# Patient Record
Sex: MALE | Race: WHITE | NOT HISPANIC OR LATINO | Employment: OTHER | URBAN - METROPOLITAN AREA
[De-identification: names, ages, dates, MRNs, and addresses within clinical notes are randomized per-mention and may not be internally consistent; named-entity substitution may affect disease eponyms.]

---

## 2017-02-20 ENCOUNTER — OFFICE VISIT (OUTPATIENT)
Dept: INTERNAL MEDICINE | Facility: CLINIC | Age: 57
End: 2017-02-20
Payer: COMMERCIAL

## 2017-02-20 VITALS
BODY MASS INDEX: 32.34 KG/M2 | TEMPERATURE: 98.5 F | DIASTOLIC BLOOD PRESSURE: 72 MMHG | SYSTOLIC BLOOD PRESSURE: 122 MMHG | HEART RATE: 56 BPM | OXYGEN SATURATION: 94 % | HEIGHT: 73 IN | WEIGHT: 244 LBS

## 2017-02-20 DIAGNOSIS — F12.10 TETRAHYDROCANNABINOL (THC) USE DISORDER, MILD, ABUSE: ICD-10-CM

## 2017-02-20 DIAGNOSIS — Z12.5 SCREENING FOR PROSTATE CANCER: ICD-10-CM

## 2017-02-20 DIAGNOSIS — E78.5 HYPERLIPIDEMIA WITH TARGET LDL LESS THAN 130: ICD-10-CM

## 2017-02-20 DIAGNOSIS — Z11.59 NEED FOR HEPATITIS C SCREENING TEST: ICD-10-CM

## 2017-02-20 DIAGNOSIS — Z12.11 SPECIAL SCREENING FOR MALIGNANT NEOPLASMS, COLON: ICD-10-CM

## 2017-02-20 DIAGNOSIS — R73.9 BLOOD GLUCOSE ELEVATED: ICD-10-CM

## 2017-02-20 DIAGNOSIS — Z91.030 BEE STING ALLERGY: ICD-10-CM

## 2017-02-20 LAB
ALBUMIN SERPL-MCNC: 4.3 G/DL (ref 3.4–5)
ALP SERPL-CCNC: 57 U/L (ref 40–150)
ALT SERPL W P-5'-P-CCNC: 38 U/L (ref 0–70)
ANION GAP SERPL CALCULATED.3IONS-SCNC: 8 MMOL/L (ref 3–14)
AST SERPL W P-5'-P-CCNC: 18 U/L (ref 0–45)
BILIRUB SERPL-MCNC: 0.6 MG/DL (ref 0.2–1.3)
BUN SERPL-MCNC: 15 MG/DL (ref 7–30)
CALCIUM SERPL-MCNC: 9.1 MG/DL (ref 8.5–10.1)
CHLORIDE SERPL-SCNC: 107 MMOL/L (ref 94–109)
CHOLEST SERPL-MCNC: 218 MG/DL
CO2 SERPL-SCNC: 25 MMOL/L (ref 20–32)
CREAT SERPL-MCNC: 1 MG/DL (ref 0.66–1.25)
GFR SERPL CREATININE-BSD FRML MDRD: 77 ML/MIN/1.7M2
GLUCOSE SERPL-MCNC: 128 MG/DL (ref 70–99)
HBA1C MFR BLD: 5.8 % (ref 4.3–6)
HCV AB SERPL QL IA: NORMAL
HDLC SERPL-MCNC: 37 MG/DL
LDLC SERPL CALC-MCNC: 143 MG/DL
NONHDLC SERPL-MCNC: 181 MG/DL
POTASSIUM SERPL-SCNC: 4.2 MMOL/L (ref 3.4–5.3)
PROT SERPL-MCNC: 8 G/DL (ref 6.8–8.8)
PSA SERPL-ACNC: 1.42 UG/L (ref 0–4)
SODIUM SERPL-SCNC: 140 MMOL/L (ref 133–144)
TRIGL SERPL-MCNC: 191 MG/DL

## 2017-02-20 PROCEDURE — G0103 PSA SCREENING: HCPCS | Performed by: INTERNAL MEDICINE

## 2017-02-20 PROCEDURE — 80053 COMPREHEN METABOLIC PANEL: CPT | Performed by: INTERNAL MEDICINE

## 2017-02-20 PROCEDURE — 83036 HEMOGLOBIN GLYCOSYLATED A1C: CPT | Performed by: INTERNAL MEDICINE

## 2017-02-20 PROCEDURE — 99214 OFFICE O/P EST MOD 30 MIN: CPT | Performed by: INTERNAL MEDICINE

## 2017-02-20 PROCEDURE — 36415 COLL VENOUS BLD VENIPUNCTURE: CPT | Performed by: INTERNAL MEDICINE

## 2017-02-20 PROCEDURE — 80061 LIPID PANEL: CPT | Performed by: INTERNAL MEDICINE

## 2017-02-20 PROCEDURE — 86803 HEPATITIS C AB TEST: CPT | Performed by: INTERNAL MEDICINE

## 2017-02-20 RX ORDER — EPINEPHRINE 0.3 MG/.3ML
0.3 INJECTION SUBCUTANEOUS
Qty: 0.6 ML | Refills: 3 | Status: SHIPPED | OUTPATIENT
Start: 2017-02-20 | End: 2018-05-16

## 2017-02-20 RX ORDER — EPINEPHRINE 0.3 MG/.3ML
0.3 INJECTION SUBCUTANEOUS
Status: CANCELLED | OUTPATIENT
Start: 2017-02-20

## 2017-02-20 NOTE — NURSING NOTE
"Chief Complaint   Patient presents with     Recheck Medication       Initial /72  Pulse 56  Temp 98.5  F (36.9  C) (Oral)  Ht 6' 1\" (1.854 m)  Wt 244 lb (110.7 kg)  SpO2 94%  BMI 32.19 kg/m2 Estimated body mass index is 32.19 kg/(m^2) as calculated from the following:    Height as of this encounter: 6' 1\" (1.854 m).    Weight as of this encounter: 244 lb (110.7 kg).  Medication Reconciliation: complete    "

## 2017-02-20 NOTE — PROGRESS NOTES
"  SUBJECTIVE:                                                    Chalo Huynh is a 56 year old male who presents to clinic today for the following health issues:      Medication Followup    Taking Medication as prescribed: NO    Side Effects:  None    Medication Helping Symptoms:  not applicable     Pt's past medical history, family history, habits, medications and allergies were reviewed with the patient today.  See snap shot for  HCM status. Most recent lab results reviewed with pt. Problem list and histories reviewed & adjusted, as indicated.  Additional history as below:     Pt declines flu shot and colonoscopy (doesn't have a  available).  Smoking THC on WE's and wants to quit. Asking about meds to try. Denies CP, SOB, abdominal pain, polyuria, polydipsia, vision changes, extremity numbness/parasthesias or skin problems.  Hx elevated lipids and glucose. Weight up 5 pounds. Rare exercise     Lab Results   Component Value Date     03/10/2015     Lab Results   Component Value Date    A1C 5.8 03/10/2015     Lab Results   Component Value Date    CHOL 176 03/10/2015     Lab Results   Component Value Date     03/10/2015     Lab Results   Component Value Date    HDL 36 03/10/2015     Lab Results   Component Value Date    TRIG 167 03/10/2015     Lab Results   Component Value Date    CR 0.93 03/10/2015     Lab Results   Component Value Date    ALT 33 03/10/2015     Lab Results   Component Value Date    AST 14 03/10/2015     Lab Results   Component Value Date    MICROL <5 03/10/2015     Lab Results   Component Value Date    TSH 3.21 03/10/2015       Additional ROS:   Constitutional, HEENT, Cardiovascular, Pulmonary, GI and , Neuro, MSK and Psych review of systems/symptoms are otherwise negative or unchanged from previous, except as noted above.      OBJECTIVE:  /72  Pulse 56  Temp 98.5  F (36.9  C) (Oral)  Ht 6' 1\" (1.854 m)  Wt 244 lb (110.7 kg)  SpO2 94%  BMI 32.19 kg/m2   Estimated body " "mass index is 32.19 kg/(m^2) as calculated from the following:    Height as of this encounter: 6' 1\" (1.854 m).    Weight as of this encounter: 244 lb (110.7 kg).  Eye: PERRL, EOMI  HENT: ear canals and TM's normal and nose and mouth without ulcers or lesions   Neck: no adenopathy. Thyroid normal to palpation. No bruits  Pulm: Lungs clear to auscultation   CV: Regular rates and rhythm  GI: Soft, mildly obese, nontender, Normal active bowel sounds, No hepatosplenomegaly or masses palpable  Ext: Peripheral pulses intact. No edema.  Neuro: Normal strength and tone, sensory exam grossly normal  Rectal: prostate symmetric w/o nodularity, no masses palpated, stool guaiac negative and prostate 1+  : testicles normal without atrophy or masses, no hernias and penis normal without urethral discharge     Assessment/Plan: (See plan discussion below for further details)  1. Bee sting allergy   Epi pen as needed  - EPINEPHrine 0.3 MG/0.3ML injection; Inject 0.3 mLs (0.3 mg) into the muscle once as needed for anaphylaxis or other  Dispense: 0.6 mL; Refill: 3    2. Tetrahydrocannabinol (THC) use disorder, mild, abuse  Literature search completed. Only partially effective medication found is acetylcysteine 1200 mg twice a day that was used in an 8 week trial.    Not available in prescription but available OTC in supplement ([employer]). Reviewed  internet and risk for possible N/V and diarrhea with supplement. Pt counselled therefore to try and stop use THC without additional medication treatment  3. Hyperlipidemia with target LDL less than 130  Labs as ordered  - Lipid panel reflex to direct LDL  - Comprehensive metabolic panel    4. Blood glucose elevated  Labs as ordered. Diet counselling as below  - Lipid panel reflex to direct LDL  - Comprehensive metabolic panel  - Hemoglobin A1c    5. Special screening for malignant neoplasms, colon  - Fecal colorectal cancer screen (FIT); Future    6. Screening for prostate cancer   " PSA ordered for intermittent monitoring after discussion with pt and pt preference. Not doing annual PSA per USPTF recs  - Prostate spec antigen screen    7. Need for hepatitis C screening test  Pt meets criteria for hepatitis C screening based on birth year 1946-65. Discussed pro/cons of Hep C screening/treatment and recs of USPTF. Pt agreeable to screening  - Hepatitis C Screen Reflex to HCV RNA Quant and Genotype    Plan discussion:  Labs today as ordered  Calorie/carbohydrate (sugar, bread, potato, pasta, etc) reduction in diet for weight loss.  Increase color on your plate with fruits and vegetables. Increase  frequency of walking or other aerobic exercise as able (goal is daily)  Pt declines colonoscopy. Will do FIT  Pt declines flu shot          Hamilton Burnett MD  Internal Medicine Department  [facility]

## 2017-02-20 NOTE — MR AVS SNAPSHOT
"              After Visit Summary   2/20/2017    Chalo Huynh    MRN: [id number]           Patient Information     Date Of Birth          1960        Visit Information        Provider Department      2/20/2017 7:00 AM Hamilton Burnett MD [facility]        Today's Diagnoses     Bee sting allergy        Tetrahydrocannabinol (THC) use disorder, mild, abuse        Hyperlipidemia with target LDL less than 130        Blood glucose elevated        Special screening for malignant neoplasms, colon        Screening for prostate cancer        Need for hepatitis C screening test           Follow-ups after your visit        Who to contact     If you have questions or need follow up information about today's clinic visit or your schedule please contact [facility] directly at [phone].  Normal or non-critical lab and imaging results will be communicated to you by [employer], letter or phone within 4 business days after the clinic has received the results. If you do not hear from us within 7 days, please contact the clinic through [employer] or phone. If you have a critical or abnormal lab result, we will notify you by phone as soon as possible.  Submit refill requests through [employer] or call your pharmacy and they will forward the refill request to us. Please allow 3 business days for your refill to be completed.          Additional Information About Your Visit        [employer] Information     [employer] lets you send messages to your doctor, view your test results, renew your prescriptions, schedule appointments and more. To sign up, go to [email or web address] . Click on \"Log in\" on the left side of the screen, which will take you to the Welcome page. Then click on \"Sign up Now\" on the right side of the page.     You will be asked to enter the access code listed below, as well as some personal information. Please follow the directions to create your username and password.   " "  Your access code is: [email or web address]  Expires: 2017 10:34 AM     Your access code will  in 90 days. If you need help or a new code, please call your [facility] or [phone].        Care EveryWhere ID     This is your Care EveryWhere ID. This could be used by other organizations to access your [place] medical records  [id number]        Your Vitals Were     Pulse Temperature Height Pulse Oximetry BMI (Body Mass Index)       56 98.5  F (36.9  C) (Oral) 6' 1\" (1.854 m) 94% 32.19 kg/m2        Blood Pressure from Last 3 Encounters:   17 122/72   09/22/15 140/90   03/05/15 136/72    Weight from Last 3 Encounters:   17 244 lb (110.7 kg)   09/22/15 239 lb (108.4 kg)   03/05/15 253 lb (114.8 kg)              We Performed the Following     Comprehensive metabolic panel     Hemoglobin A1c     Hepatitis C Screen Reflex to HCV RNA Quant and Genotype     Lipid panel reflex to direct LDL     Prostate spec antigen screen          Today's Medication Changes          These changes are accurate as of: 17 11:59 PM.  If you have any questions, ask your nurse or doctor.               These medicines have changed or have updated prescriptions.        Dose/Directions    EPINEPHrine 0.3 MG/0.3ML injection   This may have changed:    - reasons to take this  - Another medication with the same name was removed. Continue taking this medication, and follow the directions you see here.   Used for:  Bee sting allergy   Changed by:  Hamilton Burnett MD        Dose:  0.3 mg   Inject 0.3 mLs (0.3 mg) into the muscle once as needed for anaphylaxis or other   Quantity:  0.6 mL   Refills:  3         Stop taking these medicines if you haven't already. Please contact your care team if you have questions.     acetaminophen-codeine 300-30 MG per tablet   Commonly known as:  TYLENOL w/CODEINE No. 3   Stopped by:  Hamilton Burnett MD           cetirizine 10 MG tablet   Commonly known as:  zyrTEC   Stopped by:  Hamilton Burnett MD    "        clobetasol 0.05 % ointment   Commonly known as:  TEMOVATE   Stopped by:  Hamilton Burnett MD           fluocinolone 0.01 % cream   Commonly known as:  SYNALAR   Stopped by:  Hamilton Burnett MD           ibuprofen 600 MG tablet   Commonly known as:  ADVIL/MOTRIN   Stopped by:  Hamilton Burnett MD           omeprazole 20 MG tablet   Stopped by:  Hamilton Burnett MD           order for DME   Stopped by:  Hamilton Burnett MD                Where to get your medicines      These medications were sent to [facility] [id number] [place], [place] [address] AT [facility] [address], [facility] [address]    Hours:  24-hours Phone:  [phone]     EPINEPHrine 0.3 MG/0.3ML injection                Primary Care Provider Office Phone # Fax #    Hamilton Burnett [phone]       [facility] [address] [facility] [address]        Thank you!     Thank you for choosing [facility]  for your care. Our goal is always to provide you with excellent care. Hearing back from our patients is one way we can continue to improve our services. Please take a few minutes to complete the written survey that you may receive in the mail after your visit with us. Thank you!             Your Updated Medication List - Protect others around you: Learn how to safely use, store and throw away your medicines at www.disposemymeds.org.          This list is accurate as of: 2/20/17 11:59 PM.  Always use your most recent med list.                   Brand Name Dispense Instructions for use    aspirin 81 MG tablet          1 tab po QD (Once per day)       EPINEPHrine 0.3 MG/0.3ML injection     0.6 mL    Inject 0.3 mLs (0.3 mg) into the muscle once as needed for anaphylaxis or other

## 2017-02-25 DIAGNOSIS — E78.5 HYPERLIPIDEMIA LDL GOAL <100: ICD-10-CM

## 2017-02-25 DIAGNOSIS — R73.9 BLOOD GLUCOSE ELEVATED: Primary | ICD-10-CM

## 2017-03-01 ENCOUNTER — TELEPHONE (OUTPATIENT)
Dept: INTERNAL MEDICINE | Facility: CLINIC | Age: 57
End: 2017-03-01

## 2017-03-01 NOTE — TELEPHONE ENCOUNTER
Pt should not require pre-dental abx now for hx of  PDA repair done as a child per most recent AHA guidelines

## 2017-03-01 NOTE — TELEPHONE ENCOUNTER
Dental office called and had a question on rather or not patient would need antibiotics before any dental work due to  previous open heart surgery?    They can be reached back at [phone] and a voicemail can be left as well.

## 2017-03-07 NOTE — TELEPHONE ENCOUNTER
Pt is not on immunosupressants. My original advice is the same. No abx. If pt wants to talk about this more, then he may make a f/u appt  With me to discuss

## 2017-03-07 NOTE — TELEPHONE ENCOUNTER
Dental Office called again and pt states that he has no immune system and wants to know if the pt needs meds in this case? pls advise and Call dental off tele # [phone]

## 2017-04-17 ENCOUNTER — OFFICE VISIT (OUTPATIENT)
Dept: INTERNAL MEDICINE | Facility: CLINIC | Age: 57
End: 2017-04-17
Payer: COMMERCIAL

## 2017-04-17 VITALS
OXYGEN SATURATION: 94 % | DIASTOLIC BLOOD PRESSURE: 72 MMHG | BODY MASS INDEX: 30.34 KG/M2 | SYSTOLIC BLOOD PRESSURE: 130 MMHG | HEART RATE: 59 BPM | TEMPERATURE: 97.7 F | WEIGHT: 230 LBS

## 2017-04-17 DIAGNOSIS — R73.9 BLOOD GLUCOSE ELEVATED: ICD-10-CM

## 2017-04-17 DIAGNOSIS — E78.5 HYPERLIPIDEMIA LDL GOAL <100: Primary | ICD-10-CM

## 2017-04-17 DIAGNOSIS — E66.9 NON MORBID OBESITY, UNSPECIFIED OBESITY TYPE: ICD-10-CM

## 2017-04-17 PROCEDURE — 99213 OFFICE O/P EST LOW 20 MIN: CPT | Performed by: INTERNAL MEDICINE

## 2017-04-17 NOTE — MR AVS SNAPSHOT
After Visit Summary   4/17/2017    Chalo Huynh    MRN: [id number]           Patient Information     Date Of Birth          1960        Visit Information        Provider Department      4/17/2017 3:00 PM Hamilton Burnett MD [facility]        Today's Diagnoses     Hyperlipidemia LDL goal <100    -  1    Blood glucose elevated        Non morbid obesity, unspecified obesity type           Follow-ups after your visit        Your next 10 appointments already scheduled     May 25, 2017  9:00 AM CDT   LAB with [facility]   [facility]    [address] [id number] [phone]           Patient must bring picture ID.  Patient should be prepared to give a urine specimen  Please do not eat 10-12 hours before your appointment if you are coming in fasting for labs on lipids, cholesterol, or glucose (sugar).  Pregnant women should follow their Care Team instructions. Water with medications is okay. Do not drink coffee or other fluids.   If you have concerns about taking  your medications, please ask at office or if scheduling via [employer], send a message by clicking on Secure Messaging, Message Your Care Team.              Who to contact     If you have questions or need follow up information about today's clinic visit or your schedule please contact [facility] directly at [phone].  Normal or non-critical lab and imaging results will be communicated to you by [employer], letter or phone within 4 business days after the clinic has received the results. If you do not hear from us within 7 days, please contact the clinic through [employer] or phone. If you have a critical or abnormal lab result, we will notify you by phone as soon as possible.  Submit refill requests through [employer] or call your pharmacy and they will forward the refill request to us. Please allow 3 business days  "for your refill to be completed.          Additional Information About Your Visit        [employer] Information     [employer] lets you send messages to your doctor, view your test results, renew your prescriptions, schedule appointments and more. To sign up, go to [email or web address] . Click on \"Log in\" on the left side of the screen, which will take you to the Welcome page. Then click on \"Sign up Now\" on the right side of the page.     You will be asked to enter the access code listed below, as well as some personal information. Please follow the directions to create your username and password.     Your access code is: [email or web address]  Expires: 2017 11:34 AM     Your access code will  in 90 days. If you need help or a new code, please call your [place] clinic or [phone].        Care EveryWhere ID     This is your Care EveryWhere ID. This could be used by other organizations to access your [place] medical records  [id number]        Your Vitals Were     Pulse Temperature Pulse Oximetry BMI (Body Mass Index)          59 97.7  F (36.5  C) (Oral) 94% 30.34 kg/m2         Blood Pressure from Last 3 Encounters:   17 130/72   17 122/72   09/22/15 140/90    Weight from Last 3 Encounters:   17 230 lb (104.3 kg)   17 244 lb (110.7 kg)   09/22/15 239 lb (108.4 kg)              Today, you had the following     No orders found for display       Primary Care Provider Office Phone # Fax #    Hamilton Burnett [phone]       [facility] [address] [facility] [address]        Thank you!     Thank you for choosing [facility]  for your care. Our goal is always to provide you with excellent care. Hearing back from our patients is one way we can continue to improve our services. Please take a few minutes to complete the written survey that you may receive in the mail after your visit with us. Thank you!             Your Updated Medication List " - Protect others around you: Learn how to safely use, store and throw away your medicines at www.disposemymeds.org.          This list is accurate as of: 4/17/17 11:59 PM.  Always use your most recent med list.                   Brand Name Dispense Instructions for use    aspirin 81 MG tablet          1 tab po QD (Once per day)       EPINEPHrine 0.3 MG/0.3ML injection     0.6 mL    Inject 0.3 mLs (0.3 mg) into the muscle once as needed for anaphylaxis or other

## 2017-04-17 NOTE — PROGRESS NOTES
SUBJECTIVE:                                                    Chalo Huynh is a 57 year old male who presents to clinic today for the following health issues:    Chief Complaint   Patient presents with     Patient/info Update     needs to update chart in order to get life insurance     Pt's past medical history, family history, habits, medications and allergies were reviewed with the patient today.  See snap shot for  HCM status. Most recent lab results reviewed with pt. Problem list and histories reviewed & adjusted, as indicated.  Additional history as below:    Pt last seen 2 mos ago. After that appt, pt stopped all smoking of marijuana on his own without other intervention by MD. Pt denies other tobacco use now.Denies CP, SOB, abdominal pain, polyuria, polydipsia, vision changes, extremity numbness/parasthesias or skin problems.  Mood good. Elevated glucose and lipids as below. Working on dietary treatment. Since that time, pt exercising more and improved diet and has lost 14 pounds. Will have repeat labs in late May for 3 mos recheck    Lab Results   Component Value Date     02/20/2017     Lab Results   Component Value Date    A1C 5.8 02/20/2017     Lab Results   Component Value Date    CHOL 218 02/20/2017     Lab Results   Component Value Date     02/20/2017     Lab Results   Component Value Date    HDL 37 02/20/2017     Lab Results   Component Value Date    TRIG 191 02/20/2017     Lab Results   Component Value Date    CR 1.00 02/20/2017     Lab Results   Component Value Date    ALT 38 02/20/2017     Lab Results   Component Value Date    AST 18 02/20/2017     Lab Results   Component Value Date    MICROL <5 03/10/2015     Lab Results   Component Value Date    TSH 3.21 03/10/2015     No identified additional risks  The 10-year ASCVD risk score (Deysi VANE Jr, et al., 2013) is: 10.3%    Values used to calculate the score:      Age: 57 years      Sex: Male      Is Non- :  "No      Diabetic: No      Tobacco smoker: No      Systolic Blood Pressure: 130 mmHg      Is BP treated: No      HDL Cholesterol: 37 mg/dL      Total Cholesterol: 218 mg/dL    Above based on old labs and will be reassessed with repeat lipids labs in late May before deciding on whether to start statin therapy     Additional ROS:   Constitutional, HEENT, Cardiovascular, Pulmonary, GI and , Neuro, MSK and Psych review of systems/symptoms are otherwise negative or unchanged from previous, except as noted above.      OBJECTIVE:  /72  Pulse 59  Temp 97.7  F (36.5  C) (Oral)  Wt 230 lb (104.3 kg)  SpO2 94%  BMI 30.34 kg/m2   Estimated body mass index is 30.34 kg/(m^2) as calculated from the following:    Height as of 2/20/17: 6' 1\" (1.854 m).    Weight as of this encounter: 230 lb (104.3 kg).  Eye: PERRL, EOMI  HENT: ear canals and TM's normal and nose and mouth without ulcers or lesions   Neck: no adenopathy. Thyroid normal to palpation. No bruits  Pulm: Lungs clear to auscultation   CV: Regular rates and rhythm  GI: Soft, minimally obese, nontender, Normal active bowel sounds, No hepatosplenomegaly or masses palpable  Ext: Peripheral pulses intact. No edema.  Neuro: Normal strength and tone, sensory exam grossly normal  Gen: Normal affect    Assessment/Plan: (See plan discussion below for further details)  1. Hyperlipidemia LDL goal <100  See plan discussion below. Decision re: possible future statin therapy will  be based on results of labs late May    2. Blood glucose elevated  See plan discussion below.     3. Non morbid obesity, unspecified obesity type  See plan discussion below    Plan discussion:  Continue improved diet for further weight loss  and repeat labs in late May as previously ordered   Pt no longer smoking  Pt to mail in FIT stool test. Future colonoscopy when willing    Hamilton Burnett MD  Internal Medicine Department  [facility]        "

## 2017-04-17 NOTE — NURSING NOTE
"Chief Complaint   Patient presents with     Patient/info Update     needs to update chart in order to get life insurance       Initial /72  Pulse 59  Temp 97.7  F (36.5  C) (Oral)  Wt 230 lb (104.3 kg)  SpO2 94%  BMI 30.34 kg/m2 Estimated body mass index is 30.34 kg/(m^2) as calculated from the following:    Height as of 2/20/17: 6' 1\" (1.854 m).    Weight as of this encounter: 230 lb (104.3 kg).  Medication Reconciliation: complete    "

## 2017-04-21 ENCOUNTER — TELEPHONE (OUTPATIENT)
Dept: INTERNAL MEDICINE | Facility: CLINIC | Age: 57
End: 2017-04-21

## 2017-04-21 NOTE — TELEPHONE ENCOUNTER
Reason for Call:  Other call back    Detailed comments: Patient says he left a message for Dr. Burnett's nurse and is still waiting for a call back.     Phone Number Patient can be reached at: Home number on file [phone] (home)    Best Time: Any time    Can we leave a detailed message on this number? NO - Says he will be there    Call taken on 4/21/2017 at 1:12 PM by Amanda Pulido

## 2017-05-05 DIAGNOSIS — Z12.11 COLON CANCER SCREENING: Primary | ICD-10-CM

## 2017-05-25 DIAGNOSIS — E78.5 HYPERLIPIDEMIA LDL GOAL <100: ICD-10-CM

## 2017-05-25 DIAGNOSIS — R73.9 BLOOD GLUCOSE ELEVATED: ICD-10-CM

## 2017-05-25 LAB
CHOLEST SERPL-MCNC: 190 MG/DL
GLUCOSE SERPL-MCNC: 113 MG/DL (ref 70–99)
HDLC SERPL-MCNC: 43 MG/DL
LDLC SERPL CALC-MCNC: 127 MG/DL
NONHDLC SERPL-MCNC: 147 MG/DL
TRIGL SERPL-MCNC: 99 MG/DL

## 2017-05-25 PROCEDURE — 80061 LIPID PANEL: CPT | Performed by: INTERNAL MEDICINE

## 2017-05-25 PROCEDURE — 36415 COLL VENOUS BLD VENIPUNCTURE: CPT | Performed by: INTERNAL MEDICINE

## 2017-05-25 PROCEDURE — 82947 ASSAY GLUCOSE BLOOD QUANT: CPT | Performed by: INTERNAL MEDICINE

## 2017-06-03 DIAGNOSIS — E78.5 HYPERLIPIDEMIA LDL GOAL <100: ICD-10-CM

## 2017-06-03 DIAGNOSIS — R73.9 BLOOD GLUCOSE ELEVATED: Primary | ICD-10-CM

## 2017-08-20 ENCOUNTER — OFFICE VISIT (OUTPATIENT)
Dept: URGENT CARE | Facility: URGENT CARE | Age: 57
End: 2017-08-20
Payer: COMMERCIAL

## 2017-08-20 VITALS
OXYGEN SATURATION: 91 % | DIASTOLIC BLOOD PRESSURE: 64 MMHG | SYSTOLIC BLOOD PRESSURE: 106 MMHG | HEART RATE: 81 BPM | TEMPERATURE: 97 F

## 2017-08-20 DIAGNOSIS — T63.444A BEE STING REACTION, UNDETERMINED INTENT, INITIAL ENCOUNTER: Primary | ICD-10-CM

## 2017-08-20 PROCEDURE — 99215 OFFICE O/P EST HI 40 MIN: CPT | Mod: 25 | Performed by: FAMILY MEDICINE

## 2017-08-20 PROCEDURE — 96372 THER/PROPH/DIAG INJ SC/IM: CPT | Performed by: FAMILY MEDICINE

## 2017-08-20 RX ORDER — EPINEPHRINE 0.3 MG/.3ML
0.3 INJECTION SUBCUTANEOUS PRN
Qty: 0.6 ML | Refills: 1 | Status: SHIPPED | OUTPATIENT
Start: 2017-08-20 | End: 2018-06-13

## 2017-08-20 RX ORDER — METHYLPREDNISOLONE SODIUM SUCCINATE 125 MG/2ML
125 INJECTION, POWDER, LYOPHILIZED, FOR SOLUTION INTRAMUSCULAR; INTRAVENOUS ONCE
Qty: 2 ML | Refills: 0
Start: 2017-08-20 | End: 2017-08-20

## 2017-08-20 RX ORDER — PREDNISONE 20 MG/1
20 TABLET ORAL 2 TIMES DAILY
Qty: 5 TABLET | Refills: 0 | Status: SHIPPED | OUTPATIENT
Start: 2017-08-20 | End: 2018-05-16

## 2017-08-20 RX ORDER — EPINEPHRINE 0.3 MG/.3ML
0.3 INJECTION SUBCUTANEOUS ONCE
Qty: 0.3 ML | Refills: 0
Start: 2017-08-20 | End: 2017-08-20

## 2017-08-20 RX ORDER — DIPHENHYDRAMINE HCL 25 MG
50 CAPSULE ORAL ONCE
Qty: 2 CAPSULE | Refills: 0
Start: 2017-08-20 | End: 2017-08-20

## 2017-08-20 NOTE — PATIENT INSTRUCTIONS
Take benadryl 25mg as needed for itching. Caution as this can cause sedation    Take oral steroids for 3 more days. 2 pills on Monday and Tuesday. 1 pill on Weds.     If you develop throat swelling or difficulty breathing call for help immediately

## 2017-08-20 NOTE — PROGRESS NOTES
Nursing Evaluation:   There are no exam notes on file for this visit.    Subjective:   Chalo Huynh is a 57 year old male who presents for   Chief Complaint   Patient presents with     Allergic Reaction     swelling of lip and left hand from bee sting today.   .   Patient can't recall last doctor's appointment but states he had to get an epipen.   He was stung on the back of left hand about 30 minutes prior to arrival. He drove here by himself and started to feel that his face and lips were developing pressure. Last bee sting occurred on his back but he did not have anaphylaxis at that time but drove in for evaluation and was given epipen at that time. He did not self-administer his epipen.   He was outside working in the hot sun when this happened. Patient does feel lightheaded and did experience some chills 15 minutes into our assessment.   Patient also states he is starting to itch all over but this resolved with time.   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    Patient Active Problem List    Diagnosis Date Noted     Blood glucose elevated 02/25/2017     Priority: Medium     Hyperlipidemia LDL goal <100 02/25/2017     Priority: Medium     Deviated nasal septum 01/16/2004     Priority: Medium     Obesity 12/15/2002     Priority: Medium     Problem list name updated by automated process. Provider to review       Migraine variant 12/15/2002     Priority: Medium     Problem list name updated by automated process. Provider to review         Current Outpatient Prescriptions   Medication     predniSONE (DELTASONE) 20 MG tablet     EPINEPHrine (EPIPEN/ADRENACLICK/OR ANY BX GENERIC EQUIV) 0.3 MG/0.3ML injection 2-pack     methylPREDNISolone sodium succinate (SOLU-MEDROL) 125 mg/2 mL injection     diphenhydrAMINE (BENADRYL ALLERGY) 25 MG capsule     EPINEPHrine 0.3 MG/0.3ML injection     ASPIRIN 81 MG OR TABS     No current facility-administered  medications for this visit.        ROS:  As above per HPI    Objective:   /64  Pulse 81  Temp 97  F (36.1  C) (Oral)  SpO2 91%, There is no height or weight on file to calculate BMI.  Gen:  NAD, well-nourished, sitting in chair comfortably  HEENT: PERRLA; nares patent; oropharynx pink and moist, there is mild facial swelling on the right side and right side of lips are questionably swollen compared to left  Neck: supple without lymphadenopathy, trachea midline  CV:  RRR  - no murmurs, rubs, or gallups,   Pulm:  Respiratory rate 20, no accessory muscle use, clear bilaterally  ABD: non distended, soft  Extrem: no cyanosis, edema or clubbing  Skin: no obvious rashes or abnormalities, no obvious entrance point of bee/wasp sting on the dorsum of L hand where patient endorses tenderness  Psych: Euthymic, linear thoughts, normal rate of speech       Results for orders placed or performed in visit on 05/25/17   Glucose   Result Value Ref Range    Glucose 113 (H) 70 - 99 mg/dL   Lipid panel reflex to direct LDL   Result Value Ref Range    Cholesterol 190 <200 mg/dL    Triglycerides 99 <150 mg/dL    HDL Cholesterol 43 >39 mg/dL    LDL Cholesterol Calculated 127 (H) <100 mg/dL    Non HDL Cholesterol 147 (H) <130 mg/dL       Assesment & Plan:   Chalo TRES Huynh, 57 year old male who presents with:  Bee sting reaction, undetermined intent, initial encounter  - predniSONE (DELTASONE) 20 MG tablet  Dispense: 5 tablet; Refill: 0  - EPINEPHrine (EPIPEN/ADRENACLICK/OR ANY BX GENERIC EQUIV) 0.3 MG/0.3ML injection 2-pack  Dispense: 0.3 mL; Refill: 0  - methylPREDNISolone sodium succinate (SOLU-MEDROL) 125 mg/2 mL injection  Dispense: 2 mL; Refill: 0  - diphenhydrAMINE (BENADRYL ALLERGY) 25 MG capsule  Dispense: 2 capsule; Refill: 0  - METHYLPREDNISOLONE INJ / 125MG  - INJECTION INTRAMUSCULAR OR SUB-Q  - C ADRENALIN EPINEPHRINE 0.1 MG INJECTION  - INJECTION INTRAMUSCULAR OR SUB-Q   Immediately when he was roomed I requested  epipen be given and this was administered in right thigh with skin exposed. 50mg of oral benadryl given. 125mg IM solumedrol given after patient's vitals were assessed. Patient was observed for one hour with vital signs reassess throughout assessment in clinic ;  He was deemed stable for discharge to home. His diaphoresis is likely a combination of being outside in the humid weather for extended period of time in addition to sustaining the bee sting. I felt patient was safe to drive as after resting for 30 minutes laying down with a blanket he felt much better. Even with the benadryl he felt confident driving home the 1.5 miles in his car    - refill for epipens sent to pharmacy  - Ice packs to dorsum of left hand if swelling develops  - steroid taper prednisone 40mg days 2-3, 20mg days 4 and 5  - Benadryl as needed for itching    Options for treatment and/or follow-up care were reviewed with the patient. Chalo Huynh and/or legal guardian was engaged and actively involved in the decision making process. Patient/guardian verbalized understanding of the options discussed and was satisfied with the final plan.      Shoaib Mcknight MD PGY3  [phone] (Pager)  [facility]

## 2017-08-20 NOTE — MR AVS SNAPSHOT
"              After Visit Summary   8/20/2017    Chalo Huynh    MRN: [id number]           Patient Information     Date Of Birth          1960        Visit Information        Provider Department      8/20/2017 12:45 PM Shoaib Mcknight MD [facility]        Today's Diagnoses     Bee sting reaction, undetermined intent, initial encounter    -  1      Care Instructions    Take benadryl 25mg as needed for itching. Caution as this can cause sedation    Take oral steroids for 3 more days. 2 pills on Monday and Tuesday. 1 pill on Weds.     If you develop throat swelling or difficulty breathing call for help immediately          Follow-ups after your visit        Who to contact     If you have questions or need follow up information about today's clinic visit or your schedule please contact [facility] directly at [phone].  Normal or non-critical lab and imaging results will be communicated to you by MyChart, letter or phone within 4 business days after the clinic has received the results. If you do not hear from us within 7 days, please contact the clinic through MyChart or phone. If you have a critical or abnormal lab result, we will notify you by phone as soon as possible.  Submit refill requests through [employer] or call your pharmacy and they will forward the refill request to us. Please allow 3 business days for your refill to be completed.          Additional Information About Your Visit        MyChart Information     [employer] lets you send messages to your doctor, view your test results, renew your prescriptions, schedule appointments and more. To sign up, go to [email or web address] . Click on \"Log in\" on the left side of the screen, which will take you to the Welcome page. Then click on \"Sign up Now\" on the right side of the page.     You will be asked to enter the access code listed below, as well as some personal information. Please " follow the directions to create your username and password.     Your access code is: [email or web address]  Expires: 2017  1:18 PM     Your access code will  in 90 days. If you need help or a new code, please call your [facility] clinic or [phone].        Care EveryWhere ID     This is your Care EveryWhere ID. This could be used by other organizations to access your [facility] medical records  [id number]         Blood Pressure from Last 3 Encounters:   17 130/72   17 122/72   09/22/15 140/90    Weight from Last 3 Encounters:   17 230 lb (104.3 kg)   17 244 lb (110.7 kg)   09/22/15 239 lb (108.4 kg)              Today, you had the following     No orders found for display         Today's Medication Changes          These changes are accurate as of: 17  1:18 PM.  If you have any questions, ask your nurse or doctor.               Start taking these medicines.        Dose/Directions    predniSONE 20 MG tablet   Commonly known as:  DELTASONE   Used for:  Bee sting reaction, undetermined intent, initial encounter        Dose:  20 mg   Take 1 tablet (20 mg) by mouth 2 times daily   Quantity:  5 tablet   Refills:  0            Where to get your medicines      Some of these will need a paper prescription and others can be bought over the counter.  Ask your nurse if you have questions.     Bring a paper prescription for each of these medications     predniSONE 20 MG tablet                Primary Care Provider Office Phone # Fax #    Hamilton Burnett [phone]       [address] [place] [id number]        Equal Access to Services     [facility]: Hadbogdan esquivel Socorin, waaxda luqadaha, qaybta kaalmada farnaz sequeira . So [facility] [phone].    ATENCIÓN: Si habla español, tiene a tran disposición servicios gratuitos de asistencia lingüística. Llame al [phone].    We comply with applicable federal civil rights laws and [place]  laws. We do not discriminate on the basis of race, color, national origin, age, disability sex, sexual orientation or gender identity.            Thank you!     Thank you for choosing [facility]  for your care. Our goal is always to provide you with excellent care. Hearing back from our patients is one way we can continue to improve our services. Please take a few minutes to complete the written survey that you may receive in the mail after your visit with us. Thank you!             Your Updated Medication List - Protect others around you: Learn how to safely use, store and throw away your medicines at www.disposemymeds.org.          This list is accurate as of: 8/20/17  1:18 PM.  Always use your most recent med list.                   Brand Name Dispense Instructions for use Diagnosis    aspirin 81 MG tablet          1 tab po QD (Once per day)        EPINEPHrine 0.3 MG/0.3ML injection 2-pack    EPIPEN/ADRENACLICK/or ANY BX GENERIC EQUIV    0.6 mL    Inject 0.3 mLs (0.3 mg) into the muscle once as needed for anaphylaxis or other    Bee sting allergy       predniSONE 20 MG tablet    DELTASONE    5 tablet    Take 1 tablet (20 mg) by mouth 2 times daily    Bee sting reaction, undetermined intent, initial encounter

## 2018-04-05 ENCOUNTER — TELEPHONE (OUTPATIENT)
Dept: LAB | Facility: CLINIC | Age: 58
End: 2018-04-05

## 2018-05-11 ENCOUNTER — TELEPHONE (OUTPATIENT)
Dept: LAB | Facility: CLINIC | Age: 58
End: 2018-05-11

## 2018-05-11 DIAGNOSIS — E78.5 HYPERLIPIDEMIA LDL GOAL <100: ICD-10-CM

## 2018-05-11 DIAGNOSIS — Z11.4 ENCOUNTER FOR SCREENING FOR HIV: ICD-10-CM

## 2018-05-11 DIAGNOSIS — R73.9 BLOOD GLUCOSE ELEVATED: Primary | ICD-10-CM

## 2018-05-16 NOTE — TELEPHONE ENCOUNTER
Lab orders placed in chart.  Call patient.  Overdue for fasting labs to follow-up on history of elevated cholesterol and blood sugars and other healthcare maintenance screening.  Patient schedule a fasting lab appointment in the next month's time or so and then see me a few days later.  Assist patient in scheduling appointments

## 2018-05-16 NOTE — TELEPHONE ENCOUNTER
Called pt, hif girlfriend answer, no consent to communicate. She states that he is out of town until Friday fishing, no other number to reach pt on. Asked pt's girlfriend to call have him call back the clinic. WIll postpone for Friday.

## 2018-05-21 NOTE — TELEPHONE ENCOUNTER
Patient informed. Lab appt 5/25, patient unable to see Dr. Burnett until 6/8 d/t both of their schedules, appt made.

## 2018-05-24 DIAGNOSIS — R73.9 BLOOD GLUCOSE ELEVATED: ICD-10-CM

## 2018-05-24 DIAGNOSIS — Z11.4 ENCOUNTER FOR SCREENING FOR HIV: ICD-10-CM

## 2018-05-24 DIAGNOSIS — E78.5 HYPERLIPIDEMIA LDL GOAL <100: ICD-10-CM

## 2018-05-24 LAB
ALBUMIN SERPL-MCNC: 4.4 G/DL (ref 3.4–5)
ALP SERPL-CCNC: 58 U/L (ref 40–150)
ALT SERPL W P-5'-P-CCNC: 33 U/L (ref 0–70)
ANION GAP SERPL CALCULATED.3IONS-SCNC: 6 MMOL/L (ref 3–14)
AST SERPL W P-5'-P-CCNC: 21 U/L (ref 0–45)
BILIRUB SERPL-MCNC: 0.7 MG/DL (ref 0.2–1.3)
BUN SERPL-MCNC: 19 MG/DL (ref 7–30)
CALCIUM SERPL-MCNC: 9.3 MG/DL (ref 8.5–10.1)
CHLORIDE SERPL-SCNC: 109 MMOL/L (ref 94–109)
CHOLEST SERPL-MCNC: 185 MG/DL
CO2 SERPL-SCNC: 24 MMOL/L (ref 20–32)
CREAT SERPL-MCNC: 0.96 MG/DL (ref 0.66–1.25)
CREAT UR-MCNC: 170 MG/DL
GFR SERPL CREATININE-BSD FRML MDRD: 80 ML/MIN/1.7M2
GLUCOSE SERPL-MCNC: 138 MG/DL (ref 70–99)
HBA1C MFR BLD: 5.7 % (ref 0–5.6)
HDLC SERPL-MCNC: 41 MG/DL
HIV 1+2 AB+HIV1 P24 AG SERPL QL IA: NONREACTIVE
LDLC SERPL CALC-MCNC: 125 MG/DL
MICROALBUMIN UR-MCNC: 10 MG/L
MICROALBUMIN/CREAT UR: 6.12 MG/G CR (ref 0–17)
NONHDLC SERPL-MCNC: 144 MG/DL
POTASSIUM SERPL-SCNC: 4.3 MMOL/L (ref 3.4–5.3)
PROT SERPL-MCNC: 8 G/DL (ref 6.8–8.8)
SODIUM SERPL-SCNC: 139 MMOL/L (ref 133–144)
TRIGL SERPL-MCNC: 95 MG/DL
TSH SERPL DL<=0.005 MIU/L-ACNC: 2.56 MU/L (ref 0.4–4)

## 2018-05-24 PROCEDURE — 80053 COMPREHEN METABOLIC PANEL: CPT | Performed by: INTERNAL MEDICINE

## 2018-05-24 PROCEDURE — 82043 UR ALBUMIN QUANTITATIVE: CPT | Performed by: INTERNAL MEDICINE

## 2018-05-24 PROCEDURE — 87389 HIV-1 AG W/HIV-1&-2 AB AG IA: CPT | Performed by: INTERNAL MEDICINE

## 2018-05-24 PROCEDURE — 83036 HEMOGLOBIN GLYCOSYLATED A1C: CPT | Performed by: INTERNAL MEDICINE

## 2018-05-24 PROCEDURE — 36415 COLL VENOUS BLD VENIPUNCTURE: CPT | Performed by: INTERNAL MEDICINE

## 2018-05-24 PROCEDURE — 84443 ASSAY THYROID STIM HORMONE: CPT | Performed by: INTERNAL MEDICINE

## 2018-05-24 PROCEDURE — 80061 LIPID PANEL: CPT | Performed by: INTERNAL MEDICINE

## 2018-06-08 ENCOUNTER — OFFICE VISIT (OUTPATIENT)
Dept: INTERNAL MEDICINE | Facility: CLINIC | Age: 58
End: 2018-06-08
Payer: COMMERCIAL

## 2018-06-08 VITALS
RESPIRATION RATE: 16 BRPM | TEMPERATURE: 98.3 F | SYSTOLIC BLOOD PRESSURE: 122 MMHG | BODY MASS INDEX: 31.81 KG/M2 | HEIGHT: 73 IN | DIASTOLIC BLOOD PRESSURE: 78 MMHG | HEART RATE: 62 BPM | WEIGHT: 240 LBS

## 2018-06-08 DIAGNOSIS — E66.811 CLASS 1 OBESITY WITHOUT SERIOUS COMORBIDITY WITH BODY MASS INDEX (BMI) OF 31.0 TO 31.9 IN ADULT, UNSPECIFIED OBESITY TYPE: ICD-10-CM

## 2018-06-08 DIAGNOSIS — E78.5 HYPERLIPIDEMIA LDL GOAL <100: ICD-10-CM

## 2018-06-08 DIAGNOSIS — R73.9 BLOOD GLUCOSE ELEVATED: Primary | ICD-10-CM

## 2018-06-08 PROCEDURE — 99214 OFFICE O/P EST MOD 30 MIN: CPT | Performed by: INTERNAL MEDICINE

## 2018-06-08 ASSESSMENT — PAIN SCALES - GENERAL: PAINLEVEL: NO PAIN (0)

## 2018-06-08 NOTE — MR AVS SNAPSHOT
"              After Visit Summary   6/8/2018    Chalo Huynh    MRN: [id number]           Patient Information     Date Of Birth          1960        Visit Information        Provider Department      6/8/2018 8:30 AM Hamilton Burnett MD [facility]        Today's Diagnoses     Blood glucose elevated    -  1    Hyperlipidemia LDL goal <100          Care Instructions    Call  [phone] or use [facility] to schedule a future lab appointment  fasting in 3 months.   Calorie/carbohydrate (sugar, bread, potato, pasta, etc) reduction in diet for weight loss.  Increase color on your plate with fruits and vegetables. Increase  frequency of walking or other aerobic exercise as able (goal is daily)  Referral to  Dietician - Stefanie.  Call for appt and check with insurance re\": coverage  First. If not covered, then ask about the pre-diabetes class available           Follow-ups after your visit        Additional Services     NUTRITION REFERRAL       Your provider has referred you to: [facility]: [facility] [phone]   [email or web address]    Please be aware that coverage of these services is subject to the terms and limitations of your health insurance plan.  Call member services at your health plan with any benefit or coverage questions.      Please bring the following with you to your appointment:    (1) This referral request  (2) Any documents given to you regarding this referral  (3) Any specific questions you have about diet and/or food choices                  Future tests that were ordered for you today     Open Future Orders        Priority Expected Expires Ordered    Hemoglobin A1c Routine 9/6/2018 6/8/2019 6/8/2018    Glucose Routine 9/6/2018 6/8/2019 6/8/2018            Who to contact     If you have questions or need follow up information about today's clinic visit or your schedule please contact [facility] " "[facility] directly at [phone].  Normal or non-critical lab and imaging results will be communicated to you by MyChart, letter or phone within 4 business days after the clinic has received the results. If you do not hear from us within 7 days, please contact the clinic through MyChart or phone. If you have a critical or abnormal lab result, we will notify you by phone as soon as possible.  Submit refill requests through [facility]hart or call your pharmacy and they will forward the refill request to us. Please allow 3 business days for your refill to be completed.          Additional Information About Your Visit        Care EveryWhere ID     This is your Care EveryWhere ID. This could be used by other organizations to access your [place] medical records  [id number]        Your Vitals Were     Pulse Temperature Respirations Height BMI (Body Mass Index)       62 98.3  F (36.8  C) (Oral) 16 6' 1\" (1.854 m) 31.66 kg/m2        Blood Pressure from Last 3 Encounters:   06/08/18 122/78   08/20/17 106/64   04/17/17 130/72    Weight from Last 3 Encounters:   06/08/18 240 lb (108.9 kg)   04/17/17 230 lb (104.3 kg)   02/20/17 244 lb (110.7 kg)              We Performed the Following     NUTRITION REFERRAL        Primary Care Provider Office Phone # Fax #    Hamilton Burnett [phone]       [address] [facility] [address]        Equal Access to Services     [facility]: Hadii pawel ku hadasho Soomaali, waaxda luqadaha, qaybta kaalmada adeegyada, farnaz jasso. So [facility] [phone].    ATENCIÓN: Si habla español, tiene a tran disposición servicios gratuitos de asistencia lingüística. Slick al [phone].    We comply with applicable federal civil rights laws and [place] laws. We do not discriminate on the basis of race, color, national origin, age, disability, sex, sexual orientation, or gender identity.            Thank you!     Thank you for choosing [facility]  for your care. Our goal is always to provide you with excellent care. Hearing back from our patients is one way we can continue to improve our services. Please take a few minutes to complete the written survey that you may receive in the mail after your visit with us. Thank you!             Your Updated Medication List - Protect others around you: Learn how to safely use, store and throw away your medicines at www.disposemymeds.org.          This list is accurate as of 6/8/18  9:13 AM.  Always use your most recent med list.                   Brand Name Dispense Instructions for use Diagnosis    aspirin 81 MG tablet          1 tab po QD (Once per day)        EPINEPHrine 0.3 MG/0.3ML injection 2-pack    EPIPEN/ADRENACLICK/or ANY BX GENERIC EQUIV    0.6 mL    Inject 0.3 mLs (0.3 mg) into the muscle as needed for anaphylaxis    Bee sting reaction, undetermined intent, initial encounter

## 2018-06-08 NOTE — PATIENT INSTRUCTIONS
"Call  [phone] or use [employer] to schedule a future lab appointment  fasting in 3 months.   Calorie/carbohydrate (sugar, bread, potato, pasta, etc) reduction in diet for weight loss.  Increase color on your plate with fruits and vegetables. Increase  frequency of walking or other aerobic exercise as able (goal is daily)  Referral to  Dietician - Stefanie.  Call for appt and check with insurance re\": coverage  First. If not covered, then ask about the pre-diabetes class available   "

## 2018-06-08 NOTE — PROGRESS NOTES
SUBJECTIVE:   Chalo Huynh is a 58 year old male who presents to clinic today for the following health issues:    Chief Complaint   Patient presents with     Hyperlipidemia     Review Lab Results       Hyperlipidemia Follow-Up      Rate your low fat/cholesterol diet?: fair    Taking statin?  No    Other lipid medications/supplements?:  none      Amount of exercise or physical activity: Yard Work, 4 days a week    Problems taking medications regularly: No    Medication side effects: not applicable    Diet: regular (no restrictions)      Pt's past medical history, family history, habits, medications and allergies were reviewed with the patient today.  See snap shot for  HCM status. Most recent lab results reviewed with pt. Problem list and histories reviewed & adjusted, as indicated.  Additional history as below:    Lab Results   Component Value Date     05/24/2018     Lab Results   Component Value Date    A1C 5.7 05/24/2018     Lab Results   Component Value Date    CHOL 185 05/24/2018     Lab Results   Component Value Date     05/24/2018     Lab Results   Component Value Date    HDL 41 05/24/2018     Lab Results   Component Value Date    TRIG 95 05/24/2018     Lab Results   Component Value Date    CR 0.96 05/24/2018     Lab Results   Component Value Date    ALT 33 05/24/2018     Lab Results   Component Value Date    AST 21 05/24/2018     Lab Results   Component Value Date    MICROL 10 05/24/2018     Lab Results   Component Value Date    TSH 2.56 05/24/2018          No identified additional risks  The 10-year ASCVD risk score (Deysi VANE Jr, et al., 2013) is: 7.9%    Values used to calculate the score:      Age: 58 years      Sex: Male      Is Non- : No      Diabetic: No      Tobacco smoker: No      Systolic Blood Pressure: 122 mmHg      Is BP treated: No      HDL Cholesterol: 41 mg/dL      Total Cholesterol: 185 mg/dL    Additional ROS:   Constitutional, HEENT,  "Cardiovascular, Pulmonary, GI and , Neuro, MSK and Psych review of systems/symptoms are otherwise negative or unchanged from previous, except as noted above.      Use to ride bike but has flat tire so not recently. Walks Sunday AM for 4 blocks total to get Sunday paper. 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   Eating larger portions. Higher carb intake in diet.   Denies CP, SOB, abdominal pain, polyuria, polydipsia, vision changes, extremity numbness/parasthesias or skin problems.        OBJECTIVE:  /78  Pulse 62  Temp 98.3  F (36.8  C) (Oral)  Resp 16  Ht 6' 1\" (1.854 m)  Wt 240 lb (108.9 kg)  BMI 31.66 kg/m2   Estimated body mass index is 31.66 kg/(m^2) as calculated from the following:    Height as of this encounter: 6' 1\" (1.854 m).    Weight as of this encounter: 240 lb (108.9 kg).  Eye: PERRL, EOMI  HENT: ear canals and TM's normal and nose and mouth without ulcers or lesions   Neck: no adenopathy. Thyroid normal to palpation. No bruits  Pulm: Lungs clear to auscultation   CV: Regular rates and rhythm  GI: Soft, obese, nontender, Normal active bowel sounds, No hepatosplenomegaly or masses palpable  Ext: Peripheral pulses intact. No edema.  Neuro: Normal strength and tone, sensory exam grossly normal    Assessment/Plan: (See plan discussion below for further details)  1. Blood glucose elevated  See plan discussion below.   - Hemoglobin A1c; Future  - Glucose; Future  - NUTRITION REFERRAL    2. Hyperlipidemia LDL goal <100   CAD risk level minimally above that where statin recommended. Will await  Repeat blood sugar labs in 3 mos. If blood sugars  Remains > 126 meeting criteria for diabetes mellitus with elevation x2, will then start statin right away. Otherwise will repeat FLP in 6 mos and recalculate risk based on those results    3. Class 1 obesity without serious comorbidity with body mass index (BMI) of 31.0 to 31.9 in adult, unspecified obesity type  Counseling and referral to dietician " as below    Plan discussion:  Call  [phone] or use [employer] to schedule a future lab appointment  fasting in 3 months.   Calorie/carbohydrate (sugar, bread, potato, pasta, etc) reduction in diet for weight loss.  Increase color on your plate with fruits and vegetables. Increase  frequency of walking or other aerobic exercise as able (goal is daily)  Referral to  Dietician - [facility].  Call for appt and check with insurance re: coverage  First. If not covered, then ask about the pre-diabetes class available     >25 minutes was spent with the patient today with more than 50% of the appointment providing counseling/education re: risks of elevated glucose/lipids as above including  MI, stroke, kidney dz, neuropathy, vision decline, etc along discussion about initial dietary changes   and coordination of care    Hamilton Burnett MD  Internal Medicine Department  [facility]

## 2018-06-09 PROBLEM — E66.811 CLASS 1 OBESITY WITHOUT SERIOUS COMORBIDITY WITH BODY MASS INDEX (BMI) OF 31.0 TO 31.9 IN ADULT, UNSPECIFIED OBESITY TYPE: Status: ACTIVE | Noted: 2018-06-09

## 2018-06-13 ENCOUNTER — TELEPHONE (OUTPATIENT)
Dept: INTERNAL MEDICINE | Facility: CLINIC | Age: 58
End: 2018-06-13

## 2018-06-13 DIAGNOSIS — T63.444A BEE STING REACTION, UNDETERMINED INTENT, INITIAL ENCOUNTER: ICD-10-CM

## 2018-06-13 RX ORDER — EPINEPHRINE 0.3 MG/.3ML
0.3 INJECTION SUBCUTANEOUS PRN
Qty: 0.6 ML | Refills: 3 | Status: SHIPPED | OUTPATIENT
Start: 2018-06-13 | End: 2019-05-02

## 2018-06-13 NOTE — TELEPHONE ENCOUNTER
Nutrition Education Scheduling Outreach #1:    Call to patient to schedule. Left message with phone number to call to schedule.    Plan for 2nd outreach attempt within 1 week.    Matt Hummel OnCall  Diabetes and Nutrition Scheduling

## 2018-06-13 NOTE — TELEPHONE ENCOUNTER
"Requested Prescriptions   Pending Prescriptions Disp Refills     EPINEPHrine (EPIPEN/ADRENACLICK/OR ANY BX GENERIC EQUIV) 0.3 MG/0.3ML injection 2-pack 0.6 mL 1     Sig: Inject 0.3 mLs (0.3 mg) into the muscle as needed for anaphylaxis    Anaphylaxis Kits Protocol Passed    6/13/2018  7:10 AM       Passed - Recent (12 mo) or future (30 days) visit witin the authorizing provider's specialty    Patient had office visit in the last 12 months or has a visit in the next 30 days with authorizing provider or within the authorizing provider's specialty.  See \"Patient Info\" tab in inbasket, or \"Choose Columns\" in Meds & Orders section of the refill encounter.           Passed - Patient is age 5 or older        Prescription approved per [facility] Refill Protocol.    "

## 2018-06-14 NOTE — TELEPHONE ENCOUNTER
Pt declined to schedule, wants to check  Insurance coverage.     [facility] Specialist  Diabetes & Nutrition Scheduling  [address] [place] [address] [phone]

## 2018-06-28 ENCOUNTER — OFFICE VISIT (OUTPATIENT)
Dept: NUTRITION | Facility: CLINIC | Age: 58
End: 2018-06-28
Payer: COMMERCIAL

## 2018-06-28 DIAGNOSIS — E66.811 CLASS 1 OBESITY WITHOUT SERIOUS COMORBIDITY WITH BODY MASS INDEX (BMI) OF 31.0 TO 31.9 IN ADULT, UNSPECIFIED OBESITY TYPE: ICD-10-CM

## 2018-06-28 DIAGNOSIS — R73.9 BLOOD GLUCOSE ELEVATED: Primary | ICD-10-CM

## 2018-06-28 PROCEDURE — 97802 MEDICAL NUTRITION INDIV IN: CPT

## 2018-06-28 NOTE — PROGRESS NOTES
Medical Nutrition Therapy  Visit Type:Initial assessment and intervention    Chalo Huynh presents today for MNT and education related to prediabetes.   He is accompanied by self.     ASSESSMENT:   Patient comments/concerns relating to nutrition: doctor told me to come in.   He states he used to follow a healthier diet and participate in more exercise. Now he does not. He eats out almost all meals. 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 He knows he should be eating more fruits & vegetables. He did start a garden with his friend and is looking forward to harvesting vegetables from this soon. He likes fruits & vegetables but just doesn't eat much of them. He has a bike but the tires are worn out and he hasn't taken it out this summer yet. He also thinks his insurance will qualify him for a gym membership but has not looked into this. He also drinks 6 beers in an evening multiple times a week, although not every night. He states multiple times that he doesn't want to get older.     NUTRITION HISTORY:    Breakfast: skips, black coffee OR breakfast burrito  Lunch: [employer] chicken, try avoid rice   Dinner: Fast food - [employer] or [employer] - chicken sandwich, fries  Snacks: ice cream, chips   Beverages: eliminated pop from diet, water, occasionally drinks pop at the [employer] meals? Skips breakfast regularly, is not on a regular meal schedule   Eats out:  frequently     Previous diet education:  No     Food allergies/intolerances: not discussed     Diet is high in: calories, fat (saturated) and sodium  Diet is low in: fiber, fruits and vegetables    EXERCISE: no regular exercise program, walking 2 blocks on Sunday, biking for 1-2 hrs     SOCIO/ECONOMIC:   Lives with: not discussed     MEDICATIONS:  Current Outpatient Prescriptions   Medication     ASPIRIN 81 MG OR TABS     EPINEPHrine (EPIPEN/ADRENACLICK/OR ANY BX GENERIC EQUIV) 0.3  MG/0.3ML injection 2-pack     No current facility-administered medications for this visit.        LABS:  Last Basic Metabolic Panel:  Lab Results   Component Value Date     05/24/2018      Lab Results   Component Value Date    POTASSIUM 4.3 05/24/2018     Lab Results   Component Value Date    CHLORIDE 109 05/24/2018     Lab Results   Component Value Date    MIGUEL 9.3 05/24/2018     Lab Results   Component Value Date    CO2 24 05/24/2018     Lab Results   Component Value Date    BUN 19 05/24/2018     Lab Results   Component Value Date    CR 0.96 05/24/2018     Lab Results   Component Value Date     05/24/2018       ANTHROPOMETRICS:  Vitals: There were no vitals taken for this visit.  There is no height or weight on file to calculate BMI.      Wt Readings from Last 5 Encounters:   06/08/18 108.9 kg (240 lb)   04/17/17 104.3 kg (230 lb)   02/20/17 110.7 kg (244 lb)   09/22/15 108.4 kg (239 lb)   03/05/15 114.8 kg (253 lb)       Weight Change: not discussed, BMI at last visit was 31.66 kg/m2    ESTIMATED KCAL REQUIREMENTS:  1963 kcal per day, based on REE     NUTRITION DIAGNOSIS: Overweight/Obesity related to frequent fast food intake, skipping breakfast, low intake of fruits & vegetables, no exercise outside of day-to-day work as evidenced by BMI of 31.66 kg/m2    NUTRITION INTERVENTION:  Education given to support: general nutrition guidelines, weight reduction, consistent meals, sodium, fat modification, exercise, dining out/special occasions, behavior modification, portion control and heart healthy diet  Education Materials Provided: My Plate Planner/Choose My Plate and Carbohydrate Counting  Motivational Interviewing  Discussed pathophysiology of diabetes, risk factors for diabetes, and lifestyle changes to avoid its progression. Reviewed importance of regular meal schedule, cooking meals at home or bringing lunch from home, avoiding fast food, monitoring CHO intake, and eating more fruits & vegetables.  Strongly encouraged using plate planning method to help improve diet. Emphasized importance of both physical activity and improved diet to support weight loss.     PATIENT'S BEHAVIOR CHANGE GOALS:   See Patient Instructions for patient stated behavior change goals. AVS was printed and given to patient at today's appointment.    MONITOR / EVALUATE:  RD will monitor/evaluate:  Blood Glucose / A1c  Food and nutrition knowledge / skills  Food / Beverage / Nutrient intake   Weight change    FOLLOW-UP:  Follow up with RD as needed.  Call RD with questions/concerns.     Shreya Vogel RD, LD   Time spent in minutes: 50  Encounter: Individual

## 2018-06-28 NOTE — MR AVS SNAPSHOT
After Visit Summary   6/28/2018    Chalo Huynh    MRN: [id number]           Patient Information     Date Of Birth          1960        Visit Information        Provider Department      6/28/2018 2:30 PM  NUTRITION RESOURCE [facility]        Today's Diagnoses     Blood glucose elevated    -  1    Class 1 obesity without serious comorbidity with body mass index (BMI) of 31.0 to 31.9 in adult, unspecified obesity type          Care Instructions    Medical Nutrition Therapy    My Healthy Eating and Activity Goals:      Use plate planning method at meals (1/4 plate starch, 1/4 plate protein, 1/2 plate vegetables)  Increase fruit & vegetable intake  Eat 3 meals a day  Start exercising! Goal of at least 150 minutes/week activity            Follow-ups after your visit        Who to contact     If you have questions or need follow up information about today's clinic visit or your schedule please contact [facility] directly at [phone].  Normal or non-critical lab and imaging results will be communicated to you by MyChart, letter or phone within 4 business days after the clinic has received the results. If you do not hear from us within 7 days, please contact the clinic through MyChart or phone. If you have a critical or abnormal lab result, we will notify you by phone as soon as possible.  Submit refill requests through [employer] or call your pharmacy and they will forward the refill request to us. Please allow 3 business days for your refill to be completed.          Additional Information About Your Visit        Care EveryWhere ID     This is your Care EveryWhere ID. This could be used by other organizations to access your [facility] medical records  [id number]         Blood Pressure from Last 3 Encounters:   06/08/18 122/78   08/20/17 106/64   04/17/17 130/72    Weight from Last 3 Encounters:   06/08/18 108.9 kg (240 lb)   04/17/17 104.3 kg (230  lb)   02/20/17 110.7 kg (244 lb)              Today, you had the following     No orders found for display       Primary Care Provider Office Phone # Fax #    Hamilton Burnett [phone]       [address] [facility] [address]        Equal Access to Services     DEVANTE CAMARILLO : Hadii aad ku hadasho Soomaali, waaxda luqadaha, qaybta kaalmada adeegyada, waxay idiin hayaan adeeg grace lasalvadorn ah. So [facility] [phone].    ATENCIÓN: Si habla español, tiene a tran disposición servicios gratuitos de asistencia lingüística. [facility] [phone].    We comply with applicable federal civil rights laws and [place] laws. We do not discriminate on the basis of race, color, national origin, age, disability, sex, sexual orientation, or gender identity.            Thank you!     Thank you for choosing [facility]  for your care. Our goal is always to provide you with excellent care. Hearing back from our patients is one way we can continue to improve our services. Please take a few minutes to complete the written survey that you may receive in the mail after your visit with us. Thank you!             Your Updated Medication List - Protect others around you: Learn how to safely use, store and throw away your medicines at www.disposemymeds.org.          This list is accurate as of 6/28/18  3:17 PM.  Always use your most recent med list.                   Brand Name Dispense Instructions for use Diagnosis    aspirin 81 MG tablet          1 tab po QD (Once per day)        EPINEPHrine 0.3 MG/0.3ML injection 2-pack    EPIPEN/ADRENACLICK/or ANY BX GENERIC EQUIV    0.6 mL    Inject 0.3 mLs (0.3 mg) into the muscle as needed for anaphylaxis    Bee sting reaction, undetermined intent, initial encounter

## 2018-06-28 NOTE — PATIENT INSTRUCTIONS
Medical Nutrition Therapy    My Healthy Eating and Activity Goals:      Use plate planning method at meals (1/4 plate starch, 1/4 plate protein, 1/2 plate vegetables)  Increase fruit & vegetable intake  Eat 3 meals a day  Start exercising! Goal of at least 150 minutes/week activity

## 2018-09-24 ENCOUNTER — OFFICE VISIT (OUTPATIENT)
Dept: URGENT CARE | Facility: URGENT CARE | Age: 58
End: 2018-09-24
Payer: COMMERCIAL

## 2018-09-24 VITALS
BODY MASS INDEX: 31.02 KG/M2 | SYSTOLIC BLOOD PRESSURE: 150 MMHG | DIASTOLIC BLOOD PRESSURE: 88 MMHG | OXYGEN SATURATION: 91 % | RESPIRATION RATE: 16 BRPM | WEIGHT: 235.1 LBS | HEART RATE: 93 BPM

## 2018-09-24 DIAGNOSIS — T63.444A BEE STING REACTION, UNDETERMINED INTENT, INITIAL ENCOUNTER: Primary | ICD-10-CM

## 2018-09-24 PROCEDURE — 99214 OFFICE O/P EST MOD 30 MIN: CPT | Mod: 25 | Performed by: FAMILY MEDICINE

## 2018-09-24 PROCEDURE — 96372 THER/PROPH/DIAG INJ SC/IM: CPT | Performed by: FAMILY MEDICINE

## 2018-09-24 RX ORDER — CETIRIZINE HYDROCHLORIDE 10 MG/1
TABLET ORAL
Qty: 1 TABLET | Refills: 0
Start: 2018-09-24 | End: 2018-09-25

## 2018-09-24 RX ORDER — EPINEPHRINE 0.3 MG/.3ML
0.3 INJECTION SUBCUTANEOUS PRN
Qty: 0.6 ML | Refills: 1 | Status: SHIPPED | OUTPATIENT
Start: 2018-09-24 | End: 2018-10-24

## 2018-09-24 RX ORDER — METHYLPREDNISOLONE SODIUM SUCCINATE 125 MG/2ML
125 INJECTION, POWDER, LYOPHILIZED, FOR SOLUTION INTRAMUSCULAR; INTRAVENOUS ONCE
Qty: 2 ML | Refills: 0 | OUTPATIENT
Start: 2018-09-24 | End: 2018-09-24

## 2018-09-24 NOTE — PROGRESS NOTES
SUBJECTIVE: Chalo Huynh is a 58 year old male presenting with a chief complaint of bee sting left gluteal.  Onset of symptoms was 2 hour(s) ago.  Course of illness is same.    Severity moderate  Current and Associated symptoms: none  Treatment measures tried include epipen and benadryl.  Predisposing factors include None.    Past Medical History:   Diagnosis Date     Abdominal pain, right upper quadrant 11/02    negative RUQ u/s     Allergic rhinitis, cause unspecified      Basal cell carcinoma  Nov 2014     LLE (shin area)     Blood glucose elevated 2/25/2017     Deviated nasal septum 2002    CT sinuses neg for polyps     FATIGUE, POSSIBLE OBSTRUCTIVE SLEEP APNEA      Hyperlipidemia LDL goal <100 2/25/2017     Lumbago 6/02     MIGRAINE HEADACHES      Obesity, unspecified      Other and unspecified hyperlipidemia      Perforation of tympanic membrane, unspecified      Tobacco use disorder      Urethral stricture  April 2012       Past Surgical History:   Procedure Laterality Date     C NONSPECIFIC PROCEDURE  1967    PDA repair       Family History   Problem Relation Age of Onset     Cancer Father      lung CA       Social History   Substance Use Topics     Smoking status: Former Smoker     Packs/day: 1.00     Years: 12.00     Types: Cigarettes     Quit date: 2/20/2002     Smokeless tobacco: Never Used      Comment: quit with an illness-hasn't had one since 5/23/07     Alcohol use No     ROS:  SKIN: no rash  GI: no vomiting    OBJECTIVE:  /88  Pulse 93  Resp 16  Wt 235 lb 1.6 oz (106.6 kg)  SpO2 91%  BMI 31.02 kg/[id number] APPEARANCE: healthy, alert and no distress  EYES: EOMI,  PERRL, conjunctiva clear  HENT: ear canals and TM's normal.  Nose and mouth without ulcers, erythema or lesions  NECK: supple, nontender, no lymphadenopathy  RESP: lungs clear to auscultation - no rales, rhonchi or wheezes  SKIN: red welt 4cm left gluteal      ICD-10-CM    1. Bee sting reaction, undetermined intent, initial  encounter T63.444A methylPREDNISolone sodium succinate (SOLU-MEDROL) 125 mg/2 mL injection     cetirizine (ZYRTEC) 10 MG tablet     METHYLPREDNISOLONE INJ / 125MG     INJECTION INTRAMUSCULAR OR SUB-Q     EPINEPHrine (EPIPEN/ADRENACLICK/OR ANY BX GENERIC EQUIV) 0.3 MG/0.3ML injection 2-pack     Cont OTC Zyrtec for 7 days  Fluids/Rest, f/u if worse/not any better

## 2018-09-24 NOTE — MR AVS SNAPSHOT
"              After Visit Summary   2018    Chalo Huynh    MRN: [id number]           Patient Information     Date Of Birth          1960        Visit Information        Provider Department      2018 10:20 AM Carmelo Fitzgerald,  [facility]        Today's Diagnoses     Bee sting reaction, undetermined intent, initial encounter    -  1       Follow-ups after your visit        Who to contact     If you have questions or need follow up information about today's clinic visit or your schedule please contact [facility] directly at [phone].  Normal or non-critical lab and imaging results will be communicated to you by [employer], letter or phone within 4 business days after the clinic has received the results. If you do not hear from us within 7 days, please contact the clinic through [employer] or phone. If you have a critical or abnormal lab result, we will notify you by phone as soon as possible.  Submit refill requests through [employer] or call your pharmacy and they will forward the refill request to us. Please allow 3 business days for your refill to be completed.          Additional Information About Your Visit        MyChart Information     [employer] lets you send messages to your doctor, view your test results, renew your prescriptions, schedule appointments and more. To sign up, go to [email or web address] . Click on \"Log in\" on the left side of the screen, which will take you to the Welcome page. Then click on \"Sign up Now\" on the right side of the page.     You will be asked to enter the access code listed below, as well as some personal information. Please follow the directions to create your username and password.     Your access code is: [email or web address]  Expires: 2018  1:05 PM     Your access code will  in 90 days. If you need help or a new code, please call your [place] clinic or [phone].        Care EveryWhere ID     " This is your Care EveryWhere ID. This could be used by other organizations to access your [facility] medical records  [id number]        Your Vitals Were     Pulse Respirations Pulse Oximetry BMI (Body Mass Index)          93 16 91% 31.02 kg/m2         Blood Pressure from Last 3 Encounters:   09/24/18 150/88   06/08/18 122/78   08/20/17 106/64    Weight from Last 3 Encounters:   09/24/18 235 lb 1.6 oz (106.6 kg)   06/08/18 240 lb (108.9 kg)   04/17/17 230 lb (104.3 kg)              We Performed the Following     INJECTION INTRAMUSCULAR OR SUB-Q     METHYLPREDNISOLONE INJ / 125MG          Today's Medication Changes          These changes are accurate as of 9/24/18  1:05 PM.  If you have any questions, ask your nurse or doctor.               Start taking these medicines.        Dose/Directions    cetirizine 10 MG tablet   Commonly known as:  zyrTEC   Used for:  Bee sting reaction, undetermined intent, initial encounter   Started by:  Carmelo Fitzgerald DO        1 tab po in clinic   Quantity:  1 tablet   Refills:  0       methylPREDNISolone sodium succinate 125 mg/2 mL injection   Commonly known as:  solu-MEDROL   Used for:  Bee sting reaction, undetermined intent, initial encounter   Started by:  Carmelo Fitzgerald DO        Dose:  125 mg   Inject 2 mLs (125 mg) into the muscle once for 1 dose   Quantity:  2 mL   Refills:  0         These medicines have changed or have updated prescriptions.        Dose/Directions    * EPINEPHrine 0.3 MG/0.3ML injection 2-pack   Commonly known as:  EPIPEN/ADRENACLICK/or ANY BX GENERIC EQUIV   This may have changed:  Another medication with the same name was added. Make sure you understand how and when to take each.   Used for:  Bee sting reaction, undetermined intent, initial encounter   Changed by:  Carmelo Fitzgerald DO        Dose:  0.3 mg   Inject 0.3 mLs (0.3 mg) into the muscle as needed for anaphylaxis   Quantity:  0.6 mL   Refills:  3       * EPINEPHrine 0.3 MG/0.3ML injection  2-pack   Commonly known as:  EPIPEN/ADRENACLICK/or ANY BX GENERIC EQUIV   This may have changed:  You were already taking a medication with the same name, and this prescription was added. Make sure you understand how and when to take each.   Used for:  Bee sting reaction, undetermined intent, initial encounter   Changed by:  Carmelo Fitzgerald DO        Dose:  0.3 mg   Inject 0.3 mLs (0.3 mg) into the muscle as needed for anaphylaxis   Quantity:  0.6 mL   Refills:  1       * Notice:  This list has 2 medication(s) that are the same as other medications prescribed for you. Read the directions carefully, and ask your doctor or other care provider to review them with you.         Where to get your medicines      These medications were sent to [facility] [id number] [place] [address] [address] AT [place] [address] [facility] [address]     Phone:  [phone]     EPINEPHrine 0.3 MG/0.3ML injection 2-pack         Some of these will need a paper prescription and others can be bought over the counter.  Ask your nurse if you have questions.     You don't need a prescription for these medications     cetirizine 10 MG tablet    methylPREDNISolone sodium succinate 125 mg/2 mL injection                Primary Care Provider Office Phone # Fax #    Hamilton Burnett [phone] [address] [facility] [address]        Equal Access to Services     DEVANTE CAMARILLO : Hadii pawel ku hadasho Soomaali, waaxda luqadaha, qaybta kaalmada adeegyada, farnaz jasso. So [facility] [phone].    ATENCIÓN: Si habla español, tiene a tran disposición servicios gratuitos de asistencia lingüística. Llame al [phone].    We comply with applicable federal civil rights laws and [place] laws. We do not discriminate on the basis of race, color, national origin, age, disability, sex, sexual orientation, or gender identity.            Thank you!     Thank you for choosing  [facility]  for your care. Our goal is always to provide you with excellent care. Hearing back from our patients is one way we can continue to improve our services. Please take a few minutes to complete the written survey that you may receive in the mail after your visit with us. Thank you!             Your Updated Medication List - Protect others around you: Learn how to safely use, store and throw away your medicines at www.disposemymeds.org.          This list is accurate as of 9/24/18  1:05 PM.  Always use your most recent med list.                   Brand Name Dispense Instructions for use Diagnosis    aspirin 81 MG tablet          1 tab po QD (Once per day)        cetirizine 10 MG tablet    zyrTEC    1 tablet    1 tab po in clinic    Bee sting reaction, undetermined intent, initial encounter       * EPINEPHrine 0.3 MG/0.3ML injection 2-pack    EPIPEN/ADRENACLICK/or ANY BX GENERIC EQUIV    0.6 mL    Inject 0.3 mLs (0.3 mg) into the muscle as needed for anaphylaxis    Bee sting reaction, undetermined intent, initial encounter       * EPINEPHrine 0.3 MG/0.3ML injection 2-pack    EPIPEN/ADRENACLICK/or ANY BX GENERIC EQUIV    0.6 mL    Inject 0.3 mLs (0.3 mg) into the muscle as needed for anaphylaxis    Bee sting reaction, undetermined intent, initial encounter       methylPREDNISolone sodium succinate 125 mg/2 mL injection    solu-MEDROL    2 mL    Inject 2 mLs (125 mg) into the muscle once for 1 dose    Bee sting reaction, undetermined intent, initial encounter       * Notice:  This list has 2 medication(s) that are the same as other medications prescribed for you. Read the directions carefully, and ask your doctor or other care provider to review them with you.

## 2019-02-13 ENCOUNTER — TELEPHONE (OUTPATIENT)
Dept: INTERNAL MEDICINE | Facility: CLINIC | Age: 59
End: 2019-02-13

## 2019-02-13 NOTE — TELEPHONE ENCOUNTER
Panel Management Review  Patient Active Problem List   Diagnosis     Migraine variant     Deviated nasal septum     Blood glucose elevated     Hyperlipidemia LDL goal <100     Class 1 obesity without serious comorbidity with body mass index (BMI) of 31.0 to 31.9 in adult, unspecified obesity type         Patient has the following on his problem list: None      Composite cancer screening  Chart review shows that this patient is due/due soon for the following Fecal Colorectal (FIT)  Summary:    Patient is due/failing the following:   FIT    Action needed:   Come to clinic lab to get FIT Screening Test    Type of outreach:    Phone, left message for patient to call back.  and Sent letter.    Questions for provider review:    None                                                                                                                                    Magdalena Palacios, CMA

## 2019-02-13 NOTE — LETTER
[facility]  [address] [place] [address]  [phone]  February 13, 2019    Chalo Huynh  [address]  [facility] [address]    Dear Chalo,    We care about your health and based on a review of your medical records, recommend the the following, to better manage your health:      You are in particular need of attention regarding:  -Colon Cancer Screening    I am recommending that you:   -FIT Screening , please  at the clinic lab at your convenience.     Here is a list of Health Maintenance topics that are due now or due soon:    Please call us at [phone] or [phone] (or use Offer[employer]) to address the above recommendations.     Thank you for trusting [facility].  We appreciate the opportunity to serve you and look forward to supporting your healthcare needs in the future.    If you have (or plan to have) any of these tests done at a facility other than a [facility] or a [facility], please have the results from these tests sent to your primary physician at [facility].    Healthy Regards,    Hamilton Burnett MD/Magdalena Palacios CMA

## 2019-05-02 ENCOUNTER — OFFICE VISIT (OUTPATIENT)
Dept: INTERNAL MEDICINE | Facility: CLINIC | Age: 59
End: 2019-05-02
Payer: COMMERCIAL

## 2019-05-02 VITALS
WEIGHT: 234 LBS | DIASTOLIC BLOOD PRESSURE: 82 MMHG | OXYGEN SATURATION: 96 % | RESPIRATION RATE: 16 BRPM | HEART RATE: 56 BPM | TEMPERATURE: 98.2 F | SYSTOLIC BLOOD PRESSURE: 130 MMHG | HEIGHT: 73 IN | BODY MASS INDEX: 31.01 KG/M2

## 2019-05-02 DIAGNOSIS — Z12.11 SCREEN FOR COLON CANCER: ICD-10-CM

## 2019-05-02 DIAGNOSIS — M67.441 GANGLION CYST OF FLEXOR TENDON SHEATH OF FINGER OF RIGHT HAND: Primary | ICD-10-CM

## 2019-05-02 DIAGNOSIS — T63.444A BEE STING REACTION, UNDETERMINED INTENT, INITIAL ENCOUNTER: ICD-10-CM

## 2019-05-02 PROCEDURE — 99213 OFFICE O/P EST LOW 20 MIN: CPT | Performed by: INTERNAL MEDICINE

## 2019-05-02 RX ORDER — EPINEPHRINE 0.3 MG/.3ML
0.3 INJECTION SUBCUTANEOUS PRN
Qty: 0.6 ML | Refills: 3 | Status: SHIPPED | OUTPATIENT
Start: 2019-05-02 | End: 2019-11-29

## 2019-05-02 ASSESSMENT — MIFFLIN-ST. JEOR: SCORE: 1925.3

## 2019-05-02 NOTE — PROGRESS NOTES
"  SUBJECTIVE:   Chalo Huynh is a 59 year old male who presents to clinic today for the following   health issues:    Lump on right hand - dropped ladder on hand about 2-3 weeks ago.  Noticed lump 2-3 days ago.   Discomfort feeling in hand   Has noticed lump on hand growing since ladder dropped on it  Worried about possible \"limps\" as he has hx of BCC     Additional history: as documented    Reviewed  and updated as needed this visit by clinical staff  Tobacco  Allergies  Meds  Med Hx  Surg Hx  Fam Hx  Soc Hx        Reviewed and updated as needed this visit by Provider         Labs reviewed in EPIC    ROS:  Constitutional, HEENT, cardiovascular, pulmonary, gi and gu systems are negative, except as otherwise noted.    OBJECTIVE:                                                    /82   Pulse 56   Temp 98.2  F (36.8  C) (Temporal)   Resp 16   Ht 1.854 m (6' 1\")   Wt 106.1 kg (234 lb)   SpO2 96%   BMI 30.87 kg/m     Body mass index is 30.87 kg/m .  GENERAL APPEARANCE: alert, no distress and over weight  SKIN: no suspicious lesions or rashes  MSK: small nodule on flexor tendon R thumb. Moves w/thumb flexion.   Tender to palpation over nodule     ASSESSMENT/PLAN:                                                    1. Ganglion cyst of hand, right  Monitor for now- area maybe tender just due to recent local contusion. If sx worsen over time or nodule bothersome then see sports medicine  - SPORTS MEDICINE REFERRAL    2. Screen for colon cancer  - Fecal colorectal cancer screen FIT - Future (S+30); Future    3. Bee sting reaction, undetermined intent, initial encounter  - EPINEPHrine (EPIPEN/ADRENACLICK/OR ANY BX GENERIC EQUIV) 0.3 MG/0.3ML injection 2-pack; Inject 0.3 mLs (0.3 mg) into the muscle as needed for anaphylaxis  Dispense: 0.6 mL; Refill: 3    Benjamin Gerard MD  [facility]      "

## 2019-10-17 ENCOUNTER — OFFICE VISIT (OUTPATIENT)
Dept: URGENT CARE | Facility: URGENT CARE | Age: 59
End: 2019-10-17
Payer: COMMERCIAL

## 2019-10-17 VITALS
OXYGEN SATURATION: 95 % | SYSTOLIC BLOOD PRESSURE: 177 MMHG | HEART RATE: 91 BPM | TEMPERATURE: 98.8 F | DIASTOLIC BLOOD PRESSURE: 90 MMHG

## 2019-10-17 DIAGNOSIS — T78.40XA ALLERGIC REACTION, INITIAL ENCOUNTER: Primary | ICD-10-CM

## 2019-10-17 DIAGNOSIS — T63.444A BEE STING REACTION, UNDETERMINED INTENT, INITIAL ENCOUNTER: ICD-10-CM

## 2019-10-17 PROCEDURE — 99215 OFFICE O/P EST HI 40 MIN: CPT | Mod: 25 | Performed by: PHYSICIAN ASSISTANT

## 2019-10-17 PROCEDURE — 96372 THER/PROPH/DIAG INJ SC/IM: CPT | Performed by: PHYSICIAN ASSISTANT

## 2019-10-17 RX ORDER — METHYLPREDNISOLONE SODIUM SUCCINATE 125 MG/2ML
125 INJECTION, POWDER, LYOPHILIZED, FOR SOLUTION INTRAMUSCULAR; INTRAVENOUS ONCE
Status: COMPLETED | OUTPATIENT
Start: 2019-10-17 | End: 2019-10-17

## 2019-10-17 RX ORDER — EPINEPHRINE 0.3 MG/.3ML
0.3 INJECTION SUBCUTANEOUS PRN
Qty: 2 EACH | Refills: 0 | Status: SHIPPED | OUTPATIENT
Start: 2019-10-17 | End: 2022-06-21

## 2019-10-17 RX ORDER — CETIRIZINE HYDROCHLORIDE 10 MG/1
10 TABLET ORAL ONCE
Status: COMPLETED | OUTPATIENT
Start: 2019-10-17 | End: 2019-10-17

## 2019-10-17 RX ADMIN — METHYLPREDNISOLONE SODIUM SUCCINATE 125 MG: 125 INJECTION INTRAMUSCULAR; INTRAVENOUS at 13:10

## 2019-10-17 RX ADMIN — CETIRIZINE HYDROCHLORIDE 10 MG: 10 TABLET ORAL at 13:12

## 2019-10-18 NOTE — PROGRESS NOTES
SUBJECTIVE:   Chalo Huynh is a 59 year old male presenting with a chief complaint of having an allergic reaction, hives and itching.  Onset of symptoms was 1 hour(s) ago.  Course of illness is same.    Severity moderately severe  Current and Associated symptoms: allergic reaction  Treatment measures tried include epi pen.  Predisposing factors include bee sting.    Past Medical History:   Diagnosis Date     Abdominal pain, right upper quadrant     negative RUQ u/s     Allergic rhinitis, cause unspecified      Basal cell carcinoma  2014     LLE (shin area)     Blood glucose elevated 2017     Deviated nasal septum     CT sinuses neg for polyps     FATIGUE, POSSIBLE OBSTRUCTIVE SLEEP APNEA      Hyperlipidemia LDL goal <100 2017     Lumbago      MIGRAINE HEADACHES      Obesity, unspecified      Other and unspecified hyperlipidemia      Perforation of tympanic membrane, unspecified      Tobacco use disorder      Urethral stricture  2012        Allergies   Allergen Reactions     Bees      anaphylaxis     No Known Drug Allergies      Family History   Problem Relation Age of Onset     Cancer Father         lung CA       Social History     Tobacco Use     Smoking status: Former Smoker     Packs/day: 1.00     Years: 12.00     Pack years: 12.00     Types: Cigarettes     Last attempt to quit: 2002     Years since quittin.6     Smokeless tobacco: Never Used     Tobacco comment: quit with an illness-hasn't had one since 07   Substance Use Topics     Alcohol use: No       ROS:  CONSTITUTIONAL:NEGATIVE for fever, chills, change in weight  INTEGUMENTARY/SKIN: POSITIVE for swelling and itching  EYES: NEGATIVE for vision changes or irritation  ENT/MOUTH: NEGATIVE for ear, mouth and throat problems  RESP:NEGATIVE for significant cough or SOB  CV: NEGATIVE for chest pain, palpitations or peripheral edema  GI: NEGATIVE for nausea, abdominal pain, heartburn, or change in bowel  habits  : negative for and dysuria  MUSCULOSKELETAL: NEGATIVE for significant arthralgias or myalgia  NEURO: NEGATIVE for weakness, dizziness or paresthesias    OBJECTIVE  :BP (!) 177/90   Pulse 91   Temp 98.8  F (37.1  C) (Oral)   SpO2 95%   GENERAL APPEARANCE: healthy, alert and no distress  EYES: EOMI,  PERRL, conjunctiva clear  HENT: ear canals and TM's normal.  Nose and mouth without ulcers, erythema or lesions  NECK: supple, nontender, no lymphadenopathy  RESP: lungs clear to auscultation - no rales, rhonchi or wheezes  CV: regular rates and rhythm, normal S1 S2, no murmur noted  ABDOMEN:  soft, nontender, no HSM or masses and bowel sounds normal  Extremities: positive for left hand localized swelling  MS: extremities normal- no gross deformities noted, no erythema, FROM noted in all extremities  NEURO: Normal strength and tone, sensory exam grossly normal,  normal speech and mentation  SKIN: Positive for swelling, itching    Patient given solumedrol shot  Zyrtec given    ASSESSMENT/PLAN      ICD-10-CM    1. Allergic reaction, initial encounter T78.40XA cetirizine (zyrTEC) tablet 10 mg     methylPREDNISolone sodium succinate (solu-MEDROL) injection 125 mg     EPINEPHrine (EPIPEN/ADRENACLICK/OR ANY BX GENERIC EQUIV) 0.3 MG/0.3ML injection 2-pack     METHYLPREDNISOLONE INJ / 125MG   2. Bee sting reaction, undetermined intent, initial encounter T63.444A cetirizine (zyrTEC) tablet 10 mg     methylPREDNISolone sodium succinate (solu-MEDROL) injection 125 mg     EPINEPHrine (EPIPEN/ADRENACLICK/OR ANY BX GENERIC EQUIV) 0.3 MG/0.3ML injection 2-pack       Orders Placed This Encounter     METHYLPREDNISOLONE INJ / 125MG     cetirizine (zyrTEC) tablet 10 mg     methylPREDNISolone sodium succinate (solu-MEDROL) injection 125 mg     EPINEPHrine (EPIPEN/ADRENACLICK/OR ANY BX GENERIC EQUIV) 0.3 MG/0.3ML injection 2-pack     Patient treated with his own epi, solumedrol and his own benadryl  He was given epi pens  Patient  was monitored and then discharged  Go to the ED if symptoms return or worsen

## 2019-11-29 ENCOUNTER — OFFICE VISIT (OUTPATIENT)
Dept: INTERNAL MEDICINE | Facility: CLINIC | Age: 59
End: 2019-11-29
Payer: COMMERCIAL

## 2019-11-29 VITALS
HEART RATE: 64 BPM | DIASTOLIC BLOOD PRESSURE: 72 MMHG | TEMPERATURE: 98.7 F | SYSTOLIC BLOOD PRESSURE: 132 MMHG | WEIGHT: 229.4 LBS | HEIGHT: 73 IN | BODY MASS INDEX: 30.4 KG/M2 | OXYGEN SATURATION: 97 %

## 2019-11-29 DIAGNOSIS — L03.211 CELLULITIS OF FACE: ICD-10-CM

## 2019-11-29 DIAGNOSIS — H69.92 DYSFUNCTION OF LEFT EUSTACHIAN TUBE: ICD-10-CM

## 2019-11-29 DIAGNOSIS — Z12.11 SPECIAL SCREENING FOR MALIGNANT NEOPLASMS, COLON: ICD-10-CM

## 2019-11-29 DIAGNOSIS — L73.9 FOLLICULITIS: Primary | ICD-10-CM

## 2019-11-29 PROCEDURE — 99214 OFFICE O/P EST MOD 30 MIN: CPT | Performed by: INTERNAL MEDICINE

## 2019-11-29 RX ORDER — CEPHALEXIN 500 MG/1
500 CAPSULE ORAL 3 TIMES DAILY
Qty: 21 CAPSULE | Refills: 0 | Status: SHIPPED | OUTPATIENT
Start: 2019-11-29 | End: 2019-12-10

## 2019-11-29 ASSESSMENT — MIFFLIN-ST. JEOR: SCORE: 1904.43

## 2019-11-29 NOTE — PROGRESS NOTES
"Subjective     Chalo Huynh is a 59 year old male who presents to clinic today for the following health issues:    HPI   Bite       Duration: few weeks    Description (location/character/radiation): bite on L side of neck, with some scabbing and bleeding and now fullness feeling in L ear, \"feels like I've been swimming\"    Intensity:  moderate    Accompanying signs and symptoms: this morning woke up and now swelling and redness on L side of face, pt denies any pain    History (similar episodes/previous evaluation): None    Precipitating or alleviating factors: None    Therapies tried and outcome: None         As above.  For last several days, patient has been aware of a few swollen prominences on the left side of his upper neck near his jawline, which occasionally have come to a head and drained subjective purulence.  In the last few days, has also had some warm redness spreading from this area up to his nose.    Complaining also of his subjective feeling of fullness in his left ear, reduced hearing.  No fever or chills reported.      Reviewed and updated as needed this visit by Provider  Tobacco  Allergies  Meds  Problems  Med Hx  Surg Hx  Fam Hx         Review of Systems   ROS COMP: Constitutional, HEENT, cardiovascular, pulmonary, GI, , musculoskeletal, neuro, skin, endocrine and psych systems are negative, except as otherwise noted.      Objective    /72   Pulse 64   Temp 98.7  F (37.1  C) (Temporal)   Ht 1.854 m (6' 1\")   Wt 104.1 kg (229 lb 6.4 oz)   SpO2 97%   BMI 30.27 kg/m    Body mass index is 30.27 kg/m .  Physical Exam   GENERAL: healthy, alert and no distress  HENT: Right ear is normal, normal external canal normal TM.  Exam of his left ear reveals normal canal, but significant fluid behind the TM.  TM is not erythematous.  NECK: no adenopathy, no asymmetry, masses, or scars and thyroid normal to palpation  RESP: lungs clear to auscultation - no rales, rhonchi or wheezes  CV: " "regular rate and rhythm, normal S1 S2, no S3 or S4, no murmur, click or rub, no peripheral edema and peripheral pulses strong  ABDOMEN: soft, nontender, no hepatosplenomegaly, no masses and bowel sounds normal  MS: no gross musculoskeletal defects noted, no edema  SKIN: 2 small foci of folliculitis on, with surrounding well demarcated erythema that is slightly warm to touch.            Assessment & Plan     1. Folliculitis  With surrounding mild cellulitis, will treat with antibiotics as ordered below.    2. Cellulitis of face    - cephALEXin (KEFLEX) 500 MG capsule; Take 1 capsule (500 mg) by mouth 3 times daily for 7 days  Dispense: 21 capsule; Refill: 0    3. Special screening for malignant neoplasms, colon  Agrees to fit test  - Fecal colorectal cancer screen (FIT); Future    4. Dysfunction of left eustachian tube  No evidence of otitis media, recommended daily use of antihistamine/decongestant temporarily for symptom relief.       BMI:   Estimated body mass index is 30.27 kg/m  as calculated from the following:    Height as of this encounter: 1.854 m (6' 1\").    Weight as of this encounter: 104.1 kg (229 lb 6.4 oz).   Weight management plan: Discussed healthy diet and exercise guidelines        See Patient Instructions    Return in about 2 weeks (around 12/13/2019) for recheck, if symptoms fail to improve.    Medhat Jernigan MD  [facility]        "

## 2019-11-29 NOTE — PATIENT INSTRUCTIONS
"- Take the antibiotic as prescribed.  (Prescription has been sent to your pharmacy)    - Start taking Claritin-D, or Allegra-D daily for next few days.  (Available over-the-counter)  My hop is this will hasten the \"drainage\" of your left ear.    "

## 2019-11-29 NOTE — NURSING NOTE
"Chief Complaint   Patient presents with     Trauma     L side of neck x 1 month     /72   Pulse 64   Temp 98.7  F (37.1  C) (Temporal)   Ht 1.854 m (6' 1\")   Wt 104.1 kg (229 lb 6.4 oz)   SpO2 97%   BMI 30.27 kg/m   Estimated body mass index is 30.27 kg/m  as calculated from the following:    Height as of this encounter: 1.854 m (6' 1\").    Weight as of this encounter: 104.1 kg (229 lb 6.4 oz).        Health Maintenance due pending provider review:  Colonoscopy/FIT    Pt agrees to complete FIT  test    Marizol Damon CMA  "

## 2019-12-10 ENCOUNTER — OFFICE VISIT (OUTPATIENT)
Dept: INTERNAL MEDICINE | Facility: CLINIC | Age: 59
End: 2019-12-10
Payer: COMMERCIAL

## 2019-12-10 VITALS
WEIGHT: 224.7 LBS | DIASTOLIC BLOOD PRESSURE: 84 MMHG | OXYGEN SATURATION: 95 % | SYSTOLIC BLOOD PRESSURE: 122 MMHG | HEIGHT: 73 IN | HEART RATE: 56 BPM | BODY MASS INDEX: 29.78 KG/M2 | TEMPERATURE: 97.9 F

## 2019-12-10 DIAGNOSIS — H65.192 ACUTE EFFUSION OF LEFT EAR: Primary | ICD-10-CM

## 2019-12-10 PROCEDURE — 99213 OFFICE O/P EST LOW 20 MIN: CPT | Performed by: INTERNAL MEDICINE

## 2019-12-10 RX ORDER — FLUTICASONE PROPIONATE 50 MCG
2 SPRAY, SUSPENSION (ML) NASAL DAILY
Qty: 16 G | Refills: 0 | Status: SHIPPED | OUTPATIENT
Start: 2019-12-10 | End: 2022-07-18

## 2019-12-10 RX ORDER — PSEUDOEPHEDRINE HCL 120 MG/1
120 TABLET, FILM COATED, EXTENDED RELEASE ORAL EVERY 12 HOURS
Qty: 30 TABLET | Refills: 0 | Status: SHIPPED | OUTPATIENT
Start: 2019-12-10 | End: 2022-07-18

## 2019-12-10 ASSESSMENT — MIFFLIN-ST. JEOR: SCORE: 1883.11

## 2019-12-10 NOTE — NURSING NOTE
"Chief Complaint   Patient presents with     Ear Problem     /84   Pulse 56   Temp 97.9  F (36.6  C) (Temporal)   Ht 1.854 m (6' 1\")   Wt 101.9 kg (224 lb 11.2 oz)   SpO2 95%   BMI 29.65 kg/m   Estimated body mass index is 29.65 kg/m  as calculated from the following:    Height as of this encounter: 1.854 m (6' 1\").    Weight as of this encounter: 101.9 kg (224 lb 11.2 oz).        Health Maintenance due pending provider review:  FIT/colon    Pt has fit at home and will complete    Marizol Damon CMA  "

## 2019-12-10 NOTE — PROGRESS NOTES
"Subjective     Chalo Huynh is a 59 year old male who presents to clinic today for the following health issues:    HPI   Fluid in L ear      Duration: 1+ week    Description (location/character/radiation): feels like fluid in L ear, worse throughout the day, better in the morning    Intensity:  moderate    Accompanying signs and symptoms: recent course of abx for L sided neck issue    History (similar episodes/previous evaluation): None    Precipitating or alleviating factors: None    Therapies tried and outcome: None               Reviewed and updated as needed this visit by Provider         Review of Systems         Objective    /84   Pulse 56   Temp 97.9  F (36.6  C) (Temporal)   Ht 1.854 m (6' 1\")   Wt 101.9 kg (224 lb 11.2 oz)   SpO2 95%   BMI 29.65 kg/m    Body mass index is 29.65 kg/m .  Physical Exam   GENERAL APPEARANCE: healthy, alert and no distress  HENT: TM fluid left- yellow, translucent         Assessment & Plan     1. Acute effusion of left ear  Use until resolved  - pseudoePHEDrine (SUDAFED) 120 MG 12 hr tablet; Take 1 tablet (120 mg) by mouth every 12 hours  Dispense: 30 tablet; Refill: 0  - fluticasone (FLONASE) 50 MCG/ACT nasal spray; Spray 2 sprays into both nostrils daily  Dispense: 16 g; Refill: 0     BMI:   Estimated body mass index is 29.65 kg/m  as calculated from the following:    Height as of this encounter: 1.854 m (6' 1\").    Weight as of this encounter: 101.9 kg (224 lb 11.2 oz).           See Patient Instructions    No follow-ups on file.    Benjamin Gerard MD  [facility]        "

## 2020-12-03 ENCOUNTER — TRANSFERRED RECORDS (OUTPATIENT)
Dept: HEALTH INFORMATION MANAGEMENT | Facility: CLINIC | Age: 60
End: 2020-12-03

## 2020-12-03 ENCOUNTER — OFFICE VISIT (OUTPATIENT)
Dept: URGENT CARE | Facility: URGENT CARE | Age: 60
End: 2020-12-03
Payer: COMMERCIAL

## 2020-12-03 VITALS
WEIGHT: 232.2 LBS | HEART RATE: 71 BPM | BODY MASS INDEX: 30.77 KG/M2 | SYSTOLIC BLOOD PRESSURE: 124 MMHG | OXYGEN SATURATION: 95 % | HEIGHT: 73 IN | DIASTOLIC BLOOD PRESSURE: 70 MMHG | TEMPERATURE: 97.6 F | RESPIRATION RATE: 16 BRPM

## 2020-12-03 DIAGNOSIS — R09.A2 GLOBUS PHARYNGEUS: Primary | ICD-10-CM

## 2020-12-03 DIAGNOSIS — E66.811 CLASS 1 OBESITY WITHOUT SERIOUS COMORBIDITY WITH BODY MASS INDEX (BMI) OF 31.0 TO 31.9 IN ADULT, UNSPECIFIED OBESITY TYPE: ICD-10-CM

## 2020-12-03 PROCEDURE — 99214 OFFICE O/P EST MOD 30 MIN: CPT | Performed by: PHYSICIAN ASSISTANT

## 2020-12-03 ASSESSMENT — MIFFLIN-ST. JEOR: SCORE: 1912.13

## 2020-12-03 NOTE — NURSING NOTE
"Vital signs:  Temp: 97.6  F (36.4  C) Temp src: Oral BP: 124/70 Pulse: 71   Resp: 16 SpO2: 95 %     Height: 185.4 cm (6' 1\") Weight: 105.3 kg (232 lb 3.2 oz)  Estimated body mass index is 30.64 kg/m  as calculated from the following:    Height as of this encounter: 1.854 m (6' 1\").    Weight as of this encounter: 105.3 kg (232 lb 3.2 oz).          "

## 2020-12-03 NOTE — PROGRESS NOTES
SUBJECTIVE:   Chalo Huynh is a 60 year old male presenting with a chief complaint of having the sensation of something stuck in his throat.  Onset of symptoms was 1 week(s) ago.  Course of illness is same.    Severity moderate  Current and Associated symptoms: throat   Treatment measures tried include none.  Predisposing factors include negative for post nasal drainage or GERD.    Past Medical History:   Diagnosis Date     Abdominal pain, right upper quadrant     negative RUQ u/s     Allergic rhinitis, cause unspecified      Basal cell carcinoma  2014     LLE (shin area)     Blood glucose elevated 2017     Deviated nasal septum     CT sinuses neg for polyps     FATIGUE, POSSIBLE OBSTRUCTIVE SLEEP APNEA      Hyperlipidemia LDL goal <100 2017     Lumbago      MIGRAINE HEADACHES      Obesity, unspecified      Other and unspecified hyperlipidemia      Perforation of tympanic membrane, unspecified      Tobacco use disorder      Urethral stricture  2012        Allergies   Allergen Reactions     Bees      anaphylaxis     No Known Drug Allergies      Family History   Problem Relation Age of Onset     Cancer Father         lung CA         Social History     Tobacco Use     Smoking status: Former Smoker     Packs/day: 1.00     Years: 12.00     Pack years: 12.00     Types: Cigarettes     Quit date: 2002     Years since quittin.7     Smokeless tobacco: Never Used     Tobacco comment: quit with an illness-hasn't had one since 07   Substance Use Topics     Alcohol use: No       ROS:  CONSTITUTIONAL:NEGATIVE for fever, chills, change in weight  INTEGUMENTARY/SKIN: NEGATIVE for worrisome rashes, moles or lesions  EYES: NEGATIVE for vision changes or irritation  ENT/MOUTH: POSITIVE for something stuck in throat  RESP:NEGATIVE for significant cough or SOB  CV: NEGATIVE for chest pain, palpitations or peripheral edema  GI: NEGATIVE for nausea, abdominal pain, heartburn, or change  "in bowel habits  MUSCULOSKELETAL: NEGATIVE for significant arthralgias or myalgia  NEURO: NEGATIVE for weakness, dizziness or paresthesias    OBJECTIVE  :/70   Pulse 71   Temp 97.6  F (36.4  C) (Oral)   Resp 16   Ht 1.854 m (6' 1\")   Wt 105.3 kg (232 lb 3.2 oz)   SpO2 95%   BMI 30.64 kg/m    GENERAL APPEARANCE: healthy, alert and no distress  EYES: EOMI,  PERRL, conjunctiva clear  HENT: Negative for throat swelling and erythema  NECK: supple, nontender, no lymphadenopathy  RESP: lungs clear to auscultation - no rales, rhonchi or wheezes  CV: regular rates and rhythm, normal S1 S2, no murmur noted  NEURO: Normal strength and tone, sensory exam grossly normal,  normal speech and mentation  SKIN: no suspicious lesions or rashes    ASSESSMENT/PLAN:    ICD-10-CM    1. Globus pharyngeus  R09.89 OTOLARYNGOLOGY REFERRAL   2. Class 1 obesity without serious comorbidity with body mass index (BMI) of 31.0 to 31.9 in adult, unspecified obesity type  E66.9     Z68.31        Orders Placed This Encounter     OTOLARYNGOLOGY REFERRAL       Patient set up with ENT for scoping  If nothing found he may need EGD    "

## 2021-11-30 ENCOUNTER — TELEPHONE (OUTPATIENT)
Dept: INTERNAL MEDICINE | Facility: CLINIC | Age: 61
End: 2021-11-30
Payer: COMMERCIAL

## 2021-11-30 NOTE — TELEPHONE ENCOUNTER
Pt calling in inquiring when he first had a documented bee sting on file. Advised pt of date.     Antonella Dempsey RN

## 2022-01-20 ENCOUNTER — OFFICE VISIT (OUTPATIENT)
Dept: INTERNAL MEDICINE | Facility: CLINIC | Age: 62
End: 2022-01-20
Payer: COMMERCIAL

## 2022-01-20 VITALS
SYSTOLIC BLOOD PRESSURE: 124 MMHG | TEMPERATURE: 97.7 F | HEIGHT: 73 IN | HEART RATE: 97 BPM | OXYGEN SATURATION: 94 % | DIASTOLIC BLOOD PRESSURE: 74 MMHG | BODY MASS INDEX: 30.88 KG/M2 | RESPIRATION RATE: 16 BRPM | WEIGHT: 233 LBS
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
## 2022-01-20 PROCEDURE — 99213 OFFICE O/P EST LOW 20 MIN: CPT | Performed by: INTERNAL MEDICINE

## 2022-01-20 ASSESSMENT — MIFFLIN-ST. JEOR: SCORE: 1910.76

## 2022-01-20 NOTE — PATIENT INSTRUCTIONS
Heat/stretching in AM. Ice later part of the day as needed for pain/inflammation   Tylenol ES 1-2 tabs up to 3 times a day as needed for pain or may use Ibuprofen 3 tabs (600mng)  with food as needed   Stretching exercises in AM   if not improving, then either call for referral to [facility] for physical therapy or work with chiropractor through your insurance
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
## 2022-04-27 ENCOUNTER — NURSE TRIAGE (OUTPATIENT)
Dept: NURSING | Facility: CLINIC | Age: 62
End: 2022-04-27
Payer: COMMERCIAL

## 2022-04-27 ENCOUNTER — LAB (OUTPATIENT)
Dept: URGENT CARE | Facility: URGENT CARE | Age: 62
End: 2022-04-27
Payer: COMMERCIAL

## 2022-04-27 DIAGNOSIS — Z20.822 CLOSE EXPOSURE TO 2019 NOVEL CORONAVIRUS: ICD-10-CM

## 2022-04-27 LAB — SARS-COV-2 RNA RESP QL NAA+PROBE: NEGATIVE

## 2022-04-27 PROCEDURE — U0005 INFEC AGEN DETEC AMPLI PROBE: HCPCS

## 2022-04-27 PROCEDURE — U0003 INFECTIOUS AGENT DETECTION BY NUCLEIC ACID (DNA OR RNA); SEVERE ACUTE RESPIRATORY SYNDROME CORONAVIRUS 2 (SARS-COV-2) (CORONAVIRUS DISEASE [COVID-19]), AMPLIFIED PROBE TECHNIQUE, MAKING USE OF HIGH THROUGHPUT TECHNOLOGIES AS DESCRIBED BY CMS-2020-01-R: HCPCS

## 2022-04-27 NOTE — TELEPHONE ENCOUNTER
Patient had exposure to positive coworker yesterday.  Asking for pcr test. Also wants to schedule booster with [facility] pharmacy.    Warm transferred to scheduling.    Nazanin Hall RN  [facility] Nurse Advisor    Reason for Disposition    [1] CLOSE CONTACT COVID-19 EXPOSURE within last 14 days AND [2] NO symptoms    Protocols used: CORONAVIRUS (COVID-19) EXPOSURE-A-OH 1.18.2022

## 2022-04-29 ENCOUNTER — IMMUNIZATION (OUTPATIENT)
Dept: NURSING | Facility: CLINIC | Age: 62
End: 2022-04-29
Payer: COMMERCIAL

## 2022-04-29 PROCEDURE — 0064A COVID-19,PF,MODERNA (18+ YRS BOOSTER .25ML): CPT

## 2022-04-29 PROCEDURE — 91306 COVID-19,PF,MODERNA (18+ YRS BOOSTER .25ML): CPT

## 2022-05-25 ENCOUNTER — TELEPHONE (OUTPATIENT)
Dept: GASTROENTEROLOGY | Facility: CLINIC | Age: 62
End: 2022-05-25
Payer: COMMERCIAL

## 2022-05-25 ENCOUNTER — TELEPHONE (OUTPATIENT)
Dept: INTERNAL MEDICINE | Facility: CLINIC | Age: 62
End: 2022-05-25
Payer: COMMERCIAL

## 2022-05-25 DIAGNOSIS — Z12.11 SPECIAL SCREENING FOR MALIGNANT NEOPLASMS, COLON: Primary | ICD-10-CM

## 2022-05-25 NOTE — TELEPHONE ENCOUNTER
[facility] Call [facility]    Reason for Call: Other: calling to schedule colonoscopy, no order in epic     Action Taken: Other: pt will request order from pcp    Travel Screening: Not Applicable

## 2022-05-25 NOTE — TELEPHONE ENCOUNTER
Patient called, stated he would like to get a colonoscopy at [facility]. Requests a referral be put in place so he can schedule an appointment.

## 2022-05-25 NOTE — TELEPHONE ENCOUNTER
Colonoscopy ordered.  Central scheduling will call patient.  If has not heard from the scheduling office within 2 business days, tell him to  call [phone].  Chart shows the patient has been taking a baby aspirin daily for primary prevention.  At age now where risk of harm from aspirin use is greater than preventative benefit.  Would therefore have patient stop aspirin therapy completely.  Will also need to be off of aspirin when undergoing a colonoscopy

## 2022-05-25 NOTE — TELEPHONE ENCOUNTER
Patient Contact    Attempt # 1    Was call answered?  No.  Left message on voicemail with information to call me back.  Idalmis Hernandez RN

## 2022-05-27 NOTE — TELEPHONE ENCOUNTER
Patient Contact    Attempt # 3    Was call answered?  No.  Left message on voicemail with information to call me back.

## 2022-06-07 ENCOUNTER — TELEPHONE (OUTPATIENT)
Dept: GASTROENTEROLOGY | Facility: CLINIC | Age: 62
End: 2022-06-07
Payer: COMMERCIAL

## 2022-06-07 NOTE — TELEPHONE ENCOUNTER
Screening Questions  BlueKIND OF PREP RedLOCATION [review exclusion criteria] GreenSEDATION TYPE      1. Are you able to give consent for your medical care? Do you have a legal guardian or medical Power of ?  Y (Sedation review/consideration needed)    2. Have you had a positive covid test in the last 90 days? N  a. If yes, what date?N    3. Are you active on mychart? N    4. What insurance is in the chart? [employer]      3.   Ordering/Referring Provider: ANNETTE     4. BMI 30.7 [BMI OVER 40-EXTENDED PREP]  If greater than 40 review exclusion criteria [PAC APPT IF @ [facility]]        5.  Respiratory Screening :  [If yes to any of the following HOSPITAL setting only]     Do you use daily home oxygen? N    Do you have mod to severe Obstructive Sleep Apnea? N  [OKAY @ [facility] SH PH RI]   Do you have Pulmonary Hypertension? N     Do you have UNCONTROLLED asthma? N        6.   Have you had a heart or lung transplant? N      7.   Are you currently on dialysis? N [ If yes, G-PREP & HOSPITAL setting only]     8.   Do you have chronic kidney disease? N [ If yes, G-PREP ]    9.   Have you had a stroke or Transient ischemic attack (TIA - aka  mini stroke ) within 6 months?  N (If yes, please review exclusion criteria)    10.   In the past 6 months, have you had any heart related issues including cardiomyopathy or heart attack? NN           If yes, did it require cardiac stenting or other implantable device? N      11.   Do you have any implantable devices in your body (pacemaker, defib, LVAD)? N (If yes, please review exclusion criteria)    12.   Do you take nitroglycerin? N           If yes, how often? N  (if yes, HOSPITAL setting ONLY)    13.   Are you currently taking any blood thinners? N           [IF YES, INFORM PATIENT TO FOLLOW UP W/ ORDERING PROVIDER FOR BRIDGING INSTRUCTIONS]     14.   Do you have a diagnosis of diabetes? N   [ If yes, G-PREP ]    15.   [FEMALES] Are you currently pregnant? NA    If yes, how many  weeks? NA    16.   Are you taking any prescription pain medications on a routine schedule?  N  [ If yes, EXTENDED PREP.] [If yes, MAC]    17.   Do you have any chemical dependencies such as alcohol, street drugs, or methadone?  N [If yes, MAC]    18.   Do you have any history of post-traumatic stress syndrome, severe anxiety or history of psychosis?  N  [If yes, MAC]    19.   Do you transfer independently?  Y    20.  On a regular basis do you go 3-5 days between bowel movements? N   [ If yes, EXTENDED PREP.]    21.   Preferred LOCAL Pharmacy for Pre Prescription   [id number] - [place], [place] - [address] AT [facility] [address]      Scheduling Details      Caller : YNES   (Please ask for phone number if not scheduled by patient)    Type of Procedure Scheduled: COLON  Which Colonoscopy Prep was Sent?: SPLIT M   [facility]LA CF PATIENTS & GROEN'S PATIENTS REQUIRE EXTENDED PREP  Surgeon: YA  Date of Procedure: 8/5  Location:        Sedation Type: CS  Conscious Sedation- Needs  for 6 hours after the procedure  MAC/General-Needs  for 24 hours after procedure    Pre-op Required at [facility] [place], [place] and OR for MAC sedation: N  (advise patient they will need a pre-op prior to procedure -)      Informed patient they will need an adult  Y  Cannot take any type of public or medical transportation alone    Pre-Procedure Covid test [facility] or Externally: Y    Confirmed Nurse will call to complete assessment Y    Additional comments:

## 2022-06-20 DIAGNOSIS — T63.444A BEE STING REACTION, UNDETERMINED INTENT, INITIAL ENCOUNTER: ICD-10-CM

## 2022-06-20 DIAGNOSIS — T78.40XA ALLERGIC REACTION, INITIAL ENCOUNTER: ICD-10-CM

## 2022-06-20 NOTE — TELEPHONE ENCOUNTER
Reason for Call:  Other prescription    Detailed comments: Per patient, he needs an Epi pen and he is completely out. He [facility] to [facility] [address] [facility].    Phone Number Patient can be reached at: Home number on file [phone] (home)    Best Time: anytime    Can we leave a detailed message on this number? YES    Call taken on 6/20/2022 at 8:23 AM by GABBY BARRIENTOS

## 2022-06-21 NOTE — TELEPHONE ENCOUNTER
Routing refill request to provider for review/approval because:  Medication passes protocol but previously prescribed by urgent care provider    Delilah Ahuja RN

## 2022-06-22 RX ORDER — EPINEPHRINE 0.3 MG/.3ML
0.3 INJECTION SUBCUTANEOUS PRN
Qty: 2 EACH | Refills: 0 | Status: SHIPPED | OUTPATIENT
Start: 2022-06-22 | End: 2022-12-16

## 2022-07-01 ENCOUNTER — HOSPITAL ENCOUNTER (OUTPATIENT)
Dept: MRI IMAGING | Facility: CLINIC | Age: 62
Discharge: HOME | End: 2022-07-01
Attending: PSYCHIATRY & NEUROLOGY
Payer: COMMERCIAL

## 2022-07-01 DIAGNOSIS — R20.0 NUMBNESS IN FEET: ICD-10-CM

## 2022-07-01 DIAGNOSIS — M54.9 CHRONIC NECK AND BACK PAIN: ICD-10-CM

## 2022-07-01 DIAGNOSIS — G89.29 CHRONIC NECK AND BACK PAIN: ICD-10-CM

## 2022-07-01 DIAGNOSIS — M54.2 CHRONIC NECK AND BACK PAIN: ICD-10-CM

## 2022-07-01 DIAGNOSIS — R53.1 WEAKNESS: ICD-10-CM

## 2022-07-01 PROCEDURE — 72148 MRI LUMBAR SPINE W/O DYE: CPT

## 2022-07-01 PROCEDURE — 72141 MRI NECK SPINE W/O DYE: CPT

## 2022-07-18 ENCOUNTER — OFFICE VISIT (OUTPATIENT)
Dept: INTERNAL MEDICINE | Facility: CLINIC | Age: 62
End: 2022-07-18
Payer: COMMERCIAL

## 2022-07-18 VITALS
OXYGEN SATURATION: 96 % | DIASTOLIC BLOOD PRESSURE: 92 MMHG | SYSTOLIC BLOOD PRESSURE: 145 MMHG | HEIGHT: 73 IN | HEART RATE: 95 BPM | TEMPERATURE: 98.1 F | BODY MASS INDEX: 31.14 KG/M2 | WEIGHT: 235 LBS

## 2022-07-18 DIAGNOSIS — Z12.11 SCREEN FOR COLON CANCER: ICD-10-CM

## 2022-07-18 DIAGNOSIS — Z13.6 CARDIOVASCULAR SCREENING; LDL GOAL LESS THAN 100: ICD-10-CM

## 2022-07-18 DIAGNOSIS — L80 VITILIGO: ICD-10-CM

## 2022-07-18 DIAGNOSIS — R53.83 OTHER FATIGUE: ICD-10-CM

## 2022-07-18 DIAGNOSIS — I10 HYPERTENSION, UNSPECIFIED TYPE: Primary | ICD-10-CM

## 2022-07-18 LAB
ALBUMIN SERPL-MCNC: 4.3 G/DL (ref 3.4–5)
ALP SERPL-CCNC: 63 U/L (ref 40–150)
ALT SERPL W P-5'-P-CCNC: 36 U/L (ref 0–70)
ANION GAP SERPL CALCULATED.3IONS-SCNC: 5 MMOL/L (ref 3–14)
AST SERPL W P-5'-P-CCNC: 21 U/L (ref 0–45)
BILIRUB SERPL-MCNC: 0.6 MG/DL (ref 0.2–1.3)
BUN SERPL-MCNC: 14 MG/DL (ref 7–30)
CALCIUM SERPL-MCNC: 9.4 MG/DL (ref 8.5–10.1)
CHLORIDE BLD-SCNC: 108 MMOL/L (ref 94–109)
CHOLEST SERPL-MCNC: 228 MG/DL
CO2 SERPL-SCNC: 25 MMOL/L (ref 20–32)
CREAT SERPL-MCNC: 0.93 MG/DL (ref 0.66–1.25)
ERYTHROCYTE [DISTWIDTH] IN BLOOD BY AUTOMATED COUNT: 13.3 % (ref 10–15)
FASTING STATUS PATIENT QL REPORTED: YES
GFR SERPL CREATININE-BSD FRML MDRD: >90 ML/MIN/1.73M2
GLUCOSE BLD-MCNC: 123 MG/DL (ref 70–99)
HCT VFR BLD AUTO: 48.4 % (ref 40–53)
HDLC SERPL-MCNC: 39 MG/DL
HGB BLD-MCNC: 16.2 G/DL (ref 13.3–17.7)
LDLC SERPL CALC-MCNC: 126 MG/DL
MCH RBC QN AUTO: 30.6 PG (ref 26.5–33)
MCHC RBC AUTO-ENTMCNC: 33.5 G/DL (ref 31.5–36.5)
MCV RBC AUTO: 92 FL (ref 78–100)
NONHDLC SERPL-MCNC: 189 MG/DL
PLATELET # BLD AUTO: 239 10E3/UL (ref 150–450)
POTASSIUM BLD-SCNC: 4.2 MMOL/L (ref 3.4–5.3)
PROT SERPL-MCNC: 8 G/DL (ref 6.8–8.8)
RBC # BLD AUTO: 5.29 10E6/UL (ref 4.4–5.9)
SODIUM SERPL-SCNC: 138 MMOL/L (ref 133–144)
T4 FREE SERPL-MCNC: 0.86 NG/DL (ref 0.76–1.46)
TRIGL SERPL-MCNC: 317 MG/DL
TSH SERPL DL<=0.005 MIU/L-ACNC: 4.02 MU/L (ref 0.4–4)
VIT B12 SERPL-MCNC: 545 PG/ML (ref 232–1245)
WBC # BLD AUTO: 7.8 10E3/UL (ref 4–11)

## 2022-07-18 PROCEDURE — 80061 LIPID PANEL: CPT | Performed by: INTERNAL MEDICINE

## 2022-07-18 PROCEDURE — 80053 COMPREHEN METABOLIC PANEL: CPT | Performed by: INTERNAL MEDICINE

## 2022-07-18 PROCEDURE — 36415 COLL VENOUS BLD VENIPUNCTURE: CPT | Performed by: INTERNAL MEDICINE

## 2022-07-18 PROCEDURE — 82607 VITAMIN B-12: CPT | Performed by: INTERNAL MEDICINE

## 2022-07-18 PROCEDURE — 84443 ASSAY THYROID STIM HORMONE: CPT | Performed by: INTERNAL MEDICINE

## 2022-07-18 PROCEDURE — 85027 COMPLETE CBC AUTOMATED: CPT | Performed by: INTERNAL MEDICINE

## 2022-07-18 PROCEDURE — 99214 OFFICE O/P EST MOD 30 MIN: CPT | Performed by: INTERNAL MEDICINE

## 2022-07-18 PROCEDURE — 84439 ASSAY OF FREE THYROXINE: CPT | Performed by: INTERNAL MEDICINE

## 2022-07-18 NOTE — PROGRESS NOTES
"  ASSESSMENT:   1. Hypertension, unspecified type  Due to blood pressure elevation.  Patient at risk for sleep apnea which could contribute to blood pressure elevation.  Also started working caffeine recently.  Counseled to discontinue caffeine use.  Reduce salt intake.  Patient with blood pressure recheck in 10 days at [facility]    2. Other fatigue  Probable sleep apnea.  Will refer for sleep study evaluation.  Labs ordered to check other secondary causes.  Patient feels he is sleeping \"well\"  - Adult Sleep Eval & Management  Referral; Future  - Comprehensive metabolic panel; Future  - TSH with free T4 reflex; Future  - CBC with platelets; Future  - Vitamin B12; Future  - Comprehensive metabolic panel  - TSH with free T4 reflex  - CBC with platelets  - Vitamin B12  - T4 free    3. Vitiligo  Chronic and unchanged per patient counseled regarding sunblock lotion with sun exposure episodes    4. Screen for colon cancer  Scheduled for colonoscopy screening next month.  Asymptomatic    5. CARDIOVASCULAR SCREENING; LDL GOAL LESS THAN 100  Previous history hyperlipidemia.  Needs lab recheck  - Lipid panel reflex to direct LDL Fasting; Future  - Lipid panel reflex to direct LDL Fasting      PLAN:    Stop caffeine intake with coffee and reduce salt intake   Labs as ordered today   Get blood pressure recheck at [facility]  7/28/22 at 8:45am  Referral to  Sleep clinic re: probable obstructive sleep apnea. They will call  to schedule  Colonoscopy in early August as scheduled  Monitor vitiligo changes anterior neck. Use sun screen if high sun exposure      (Chart documentation was completed, in part, with [employer] voice-recognition software. Even though reviewed, some grammatical, spelling, and word errors may remain.)    Hamilton Burnett MD  Internal Medicine Department  [facility]      Michael Isabel is a 62 year old, presenting for the following health issues:  Derm " "Problem (Discoloring in the neck) and Recheck Medication (Epi Pen)      History of Present Illness       Reason for visit:  Check.  LaceyHe consumes 0 sweetened beverage(s) daily.He exercises with enough effort to increase his heart rate 9 or less minutes per day.  He exercises with enough effort to increase his heart rate 3 or less days per week. He is missing 1 dose(s) of medications per week.        Most recent lab results reviewed with pt.      Blood pressure elevated today.  Was previously normal in January 2022.  Weight up 2 pounds since that time  Denies chest pain, shortness of breath, abdominal pain, headache, vision changes.  Has of daytime fatigue.  Some snoring per patient's girlfriend who sleeps with him.  Has some decreased pigmentation in his anterior neck area.  Patient states it has been there a long time without change and wonders if he needs to think about it.  Scheduled for colonoscopy screening next month  History drinking coffee with caffeine recently.  This is new for him         Additional ROS:   Constitutional, HEENT, Cardiovascular, Pulmonary, GI and , Neuro, MSK and Psych review of systems/symptoms are otherwise negative or unchanged from previous, except as noted above.      OBJECTIVE:  BP (!) 145/92   Pulse 95   Temp 98.1  F (36.7  C)   Ht 1.854 m (6' 1\")   Wt 106.6 kg (235 lb)   SpO2 96%   BMI 31.00 kg/m     Estimated body mass index is 31 kg/m  as calculated from the following:    Height as of this encounter: 1.854 m (6' 1\").    Weight as of this encounter: 106.6 kg (235 lb).     HENT: ear canals and TM's normal and nose and mouth without ulcers or lesions.  Bilateral posterior pharyngeal airway due to obesity   Neck: no adenopathy. Thyroid normal to palpation. No bruits  Pulm: Lungs clear to auscultation   CV: Regular rates and rhythm  GI: Soft, mildly obese, nontender, Normal active bowel sounds, No hepatosplenomegaly or masses palpable  Ext: Peripheral pulses intact. No " edema.  Skin: Hypopigmentation patch consistent with vitiligo anterior neck below the chin to the sternal notch

## 2022-07-18 NOTE — PATIENT INSTRUCTIONS
Stop caffeine intake with coffee and reduce salt intake   Labs as ordered today   Get blood pressure recheck at [facility] pharmacy  7/28/22 at 8:45am  Referral to  Sleep clinic re: probable obstructive sleep apnea. They will call  to schedule  Colonoscopy in early August as scheduled  Monitor vitiligo changes anterior neck. Use sun screen if high sun exposure

## 2022-08-02 ENCOUNTER — LAB (OUTPATIENT)
Dept: URGENT CARE | Facility: URGENT CARE | Age: 62
End: 2022-08-02
Payer: COMMERCIAL

## 2022-08-02 DIAGNOSIS — Z20.822 ENCOUNTER FOR LABORATORY TESTING FOR COVID-19 VIRUS: ICD-10-CM

## 2022-08-02 PROCEDURE — U0003 INFECTIOUS AGENT DETECTION BY NUCLEIC ACID (DNA OR RNA); SEVERE ACUTE RESPIRATORY SYNDROME CORONAVIRUS 2 (SARS-COV-2) (CORONAVIRUS DISEASE [COVID-19]), AMPLIFIED PROBE TECHNIQUE, MAKING USE OF HIGH THROUGHPUT TECHNOLOGIES AS DESCRIBED BY CMS-2020-01-R: HCPCS

## 2022-08-02 PROCEDURE — U0005 INFEC AGEN DETEC AMPLI PROBE: HCPCS

## 2022-08-03 ENCOUNTER — LAB (OUTPATIENT)
Dept: URGENT CARE | Facility: URGENT CARE | Age: 62
End: 2022-08-03
Payer: COMMERCIAL

## 2022-08-03 DIAGNOSIS — Z20.822 ENCOUNTER FOR LABORATORY TESTING FOR COVID-19 VIRUS: ICD-10-CM

## 2022-08-03 LAB
SARS-COV-2 RNA RESP QL NAA+PROBE: NEGATIVE
SARS-COV-2 RNA RESP QL NAA+PROBE: NEGATIVE

## 2022-08-03 PROCEDURE — U0003 INFECTIOUS AGENT DETECTION BY NUCLEIC ACID (DNA OR RNA); SEVERE ACUTE RESPIRATORY SYNDROME CORONAVIRUS 2 (SARS-COV-2) (CORONAVIRUS DISEASE [COVID-19]), AMPLIFIED PROBE TECHNIQUE, MAKING USE OF HIGH THROUGHPUT TECHNOLOGIES AS DESCRIBED BY CMS-2020-01-R: HCPCS

## 2022-08-03 PROCEDURE — U0005 INFEC AGEN DETEC AMPLI PROBE: HCPCS

## 2022-08-05 ENCOUNTER — HOSPITAL ENCOUNTER (OUTPATIENT)
Facility: CLINIC | Age: 62
Discharge: HOME | End: 2022-08-05
Attending: INTERNAL MEDICINE | Admitting: INTERNAL MEDICINE
Payer: COMMERCIAL

## 2022-08-05 VITALS
BODY MASS INDEX: 31.14 KG/M2 | HEART RATE: 54 BPM | DIASTOLIC BLOOD PRESSURE: 88 MMHG | TEMPERATURE: 97.5 F | HEIGHT: 73 IN | SYSTOLIC BLOOD PRESSURE: 149 MMHG | OXYGEN SATURATION: 94 % | WEIGHT: 235 LBS | RESPIRATION RATE: 12 BRPM

## 2022-08-05 LAB — COLONOSCOPY: NORMAL

## 2022-08-05 PROCEDURE — 45380 COLONOSCOPY AND BIOPSY: CPT | Performed by: INTERNAL MEDICINE

## 2022-08-05 PROCEDURE — 88305 TISSUE EXAM BY PATHOLOGIST: CPT | Mod: 26 | Performed by: PATHOLOGY

## 2022-08-05 PROCEDURE — 250N000011 HC RX IP 250 OP 636: Performed by: INTERNAL MEDICINE

## 2022-08-05 PROCEDURE — 88305 TISSUE EXAM BY PATHOLOGIST: CPT | Mod: TC | Performed by: INTERNAL MEDICINE

## 2022-08-05 PROCEDURE — 258N000003 HC RX IP 258 OP 636: Performed by: INTERNAL MEDICINE

## 2022-08-05 PROCEDURE — 99153 MOD SED SAME PHYS/QHP EA: CPT | Performed by: INTERNAL MEDICINE

## 2022-08-05 PROCEDURE — G0500 MOD SEDAT ENDO SERVICE >5YRS: HCPCS | Performed by: INTERNAL MEDICINE

## 2022-08-05 RX ORDER — SODIUM CHLORIDE 9 MG/ML
INJECTION, SOLUTION INTRAVENOUS CONTINUOUS PRN
Status: DISCONTINUED | OUTPATIENT
Start: 2022-08-05 | End: 2022-08-05 | Stop reason: HOSPADM

## 2022-08-05 RX ORDER — FENTANYL CITRATE 50 UG/ML
INJECTION, SOLUTION INTRAMUSCULAR; INTRAVENOUS PRN
Status: DISCONTINUED | OUTPATIENT
Start: 2022-08-05 | End: 2022-08-05 | Stop reason: HOSPADM

## 2022-08-05 RX ADMIN — FENTANYL CITRATE 100 MCG: 50 INJECTION, SOLUTION INTRAMUSCULAR; INTRAVENOUS at 11:11

## 2022-08-05 RX ADMIN — MIDAZOLAM 2 MG: 1 INJECTION INTRAMUSCULAR; INTRAVENOUS at 11:13

## 2022-08-05 RX ADMIN — MIDAZOLAM 2 MG: 1 INJECTION INTRAMUSCULAR; INTRAVENOUS at 11:11

## 2022-08-05 RX ADMIN — SODIUM CHLORIDE 125 ML/HR: 9 INJECTION, SOLUTION INTRAVENOUS at 11:01

## 2022-08-05 NOTE — H&P
[facility] Anesthesia Pre-op History and Physical    Chalo Huynh MRN# [id number]   Age: 62 year old YOB: 1960      Date of Surgery: today Location [facility]      Date of Exam 8/5/2022 Facility (Same day)       Home clinic: unknown  Primary care provider: Hamilton Burnett         Chief Complaint and/or Reason for Procedure:   No chief complaint on file.           Active problem list:     Patient Active Problem List    Diagnosis Date Noted     Class 1 obesity without serious comorbidity with body mass index (BMI) of 31.0 to 31.9 in adult, unspecified obesity type 06/09/2018     Priority: Medium     Blood glucose elevated 02/25/2017     Priority: Medium     Hyperlipidemia LDL goal <100 02/25/2017     Priority: Medium     Deviated nasal septum 01/16/2004     Priority: Medium     Migraine variant 12/15/2002     Priority: Medium     Problem list name updated by automated process. Provider to review              Medications (include herbals and vitamins):   Any Plavix use in the last 7 days? No     No current facility-administered medications for this encounter.             Allergies:      Allergies   Allergen Reactions     Bees      anaphylaxis     No Known Drug Allergies      Allergy to Latex? No  Allergy to tape?   No  Intolerances: NKDA            Physical Exam:   All vitals have been reviewed  Patient Vitals for the past 8 hrs:   BP Pulse Resp SpO2   08/05/22 1054 -- -- 16 --   08/05/22 1030 (!) 147/92 64 15 93 %   08/05/22 1020 (!) 194/109 81 -- 91 %     No intake/output data recorded.  Airway assessment:   Patient is able to open mouth wide  Patient is able to stick out tongue}              Lab / Radiology Results:             Anesthetic risk and/or ASA classification:       Kd Leger MD

## 2022-08-08 LAB
PATH REPORT.COMMENTS IMP SPEC: NORMAL
PATH REPORT.COMMENTS IMP SPEC: NORMAL
PATH REPORT.FINAL DX SPEC: NORMAL
PATH REPORT.GROSS SPEC: NORMAL
PATH REPORT.MICROSCOPIC SPEC OTHER STN: NORMAL
PATH REPORT.RELEVANT HX SPEC: NORMAL
PHOTO IMAGE: NORMAL

## 2022-11-16 ENCOUNTER — OFFICE VISIT (OUTPATIENT)
Dept: URGENT CARE | Facility: URGENT CARE | Age: 62
End: 2022-11-16
Payer: COMMERCIAL

## 2022-11-16 VITALS
TEMPERATURE: 97.7 F | HEART RATE: 70 BPM | WEIGHT: 235 LBS | SYSTOLIC BLOOD PRESSURE: 154 MMHG | OXYGEN SATURATION: 95 % | DIASTOLIC BLOOD PRESSURE: 96 MMHG | RESPIRATION RATE: 14 BRPM | BODY MASS INDEX: 31 KG/M2

## 2022-11-16 DIAGNOSIS — G89.29 CHRONIC RIGHT SHOULDER PAIN: Primary | ICD-10-CM

## 2022-11-16 DIAGNOSIS — M25.511 CHRONIC RIGHT SHOULDER PAIN: Primary | ICD-10-CM

## 2022-11-16 PROCEDURE — 99213 OFFICE O/P EST LOW 20 MIN: CPT | Performed by: PHYSICIAN ASSISTANT

## 2022-11-16 NOTE — PROGRESS NOTES
SUBJECTIVE:  Chief Complaint   Patient presents with     Urgent Care     Was washing the floor and was wringing the wash cloth and started to have pain on the right upper chest muscle      Chalo Huynh is a 62 year old male presents with a chief complaint of right shoulder pain.  The injury occurred 1 day(s) ago.   The injury happened while at home. How: as aboveimmediate pain.  The patient complained of mild pain  and has not had decreased ROM.  Pain exacerbated by weight-bearing.  Relieved by rest.  He treated it initially with Tylenol. This is not the first time this type of injury has occurred to this patient.     Past Medical History:   Diagnosis Date     Abdominal pain, right upper quadrant     negative RUQ u/s     Allergic rhinitis, cause unspecified      Basal cell carcinoma  2014     LLE (shin area)     Blood glucose elevated 2017     Deviated nasal septum     CT sinuses neg for polyps     FATIGUE, POSSIBLE OBSTRUCTIVE SLEEP APNEA      Hyperlipidemia LDL goal <100 2017     Lumbago      MIGRAINE HEADACHES      Obesity, unspecified      Other and unspecified hyperlipidemia      Perforation of tympanic membrane, unspecified      Tobacco use disorder      Urethral stricture  2012     Current Outpatient Medications   Medication Sig Dispense Refill     EPINEPHrine (ANY BX GENERIC EQUIV) 0.3 MG/0.3ML injection 2-pack Inject 0.3 mLs (0.3 mg) into the muscle as needed for anaphylaxis (Patient not taking: Reported on 2022) 2 each 0     Social History     Tobacco Use     Smoking status: Former     Packs/day: 1.00     Years: 12.00     Pack years: 12.00     Types: Cigarettes     Quit date: 2002     Years since quittin.7     Smokeless tobacco: Never     Tobacco comments:     quit with an illness-hasn't had one since 07   Substance Use Topics     Alcohol use: No       ROS:  Review of systems negative except as stated above.    EXAM:   BP (!) 154/96   Pulse 70    Temp 97.7  F (36.5  C)   Resp 14   Wt 106.6 kg (235 lb)   SpO2 95%   BMI 31.00 kg/m    Gen: healthy,alert,no distress  Shoulder: tender generally, pain with particular movements  EXTREMITIES: peripheral pulses normal  MS: no gross deformities noted, no evidence of inflammation in joints, FROM in all extremities.  SKIN: no suspicious lesions or rashes  NEURO: Normal strength and tone, sensory exam grossly normal, mentation intact and speech normal      ASSESSMENT:   (M25.511,  G89.29) Chronic right shoulder pain  (primary encounter diagnosis)  Comment: no risk of fracture appreciated  Plan: Orthopedic  Referral            Follow up with ortho if symptoms worsen or fail to improve in 1 week

## 2022-11-21 ENCOUNTER — TELEPHONE (OUTPATIENT)
Dept: INTERNAL MEDICINE | Facility: CLINIC | Age: 62
End: 2022-11-21

## 2022-11-21 NOTE — TELEPHONE ENCOUNTER
Reason for Call:  Other returning call    Detailed comments: Paige from [facility] called back and stated they never prescribed anything for Chalo. Only suggested over the counter medications Tylenol and Ibuprofen.     Phone Number Patient can be reached at: [phone]    Best Time: Any    Can we leave a detailed message on this number? YES    Call taken on 11/21/2022 at 11:23 AM by Lexie Wilson

## 2022-11-21 NOTE — TELEPHONE ENCOUNTER
New Medication Request        What medication are you requesting?: Pain medication     Reason for medication request: Pt stated he was seen at urgent care and was suppose to have a pain medication sent to [facility], Pt stated it was never sent     Have you taken this medication before?: No, Pt does not remember what type of medication and pt stated that it was 600 MG of some type of medication     Controlled Substance Agreement on file:   CSA -- Patient Level:    CSA: None found at the patient level.         Patient offered an appointment? Yes:     Preferred Pharmacy:  [id number] - [facility] [address] AT [facility] [address]  [facility] [address]  Phone: [phone] Fax: [phone]      Okay to leave a detailed message?: Yes at Cell number on file:    Telephone Information:   Mobile [phone]

## 2022-11-21 NOTE — TELEPHONE ENCOUNTER
Patient walked into the clinic stating someone was going to send a short term Rx for right shoulder pain. He was seen by Dr. Victor at [facility] urgent care on 11/17/22. Triage left a message with [facility] asking if Dr. Victor was going to send an Rx and requested a call back to patient and  triage nurse. If Dr. Victor was not going to send an Rx, can potentially request Dr. Burnett for a pain med sent to: [facility] [address] [facility].     Patient has been having right shoulder pain flare ups once a week for 10 minutes varying in severity. He did say he had imaging done and no abnormality found - I am assuming at [facility].     Patient reported having 4 PT cessions scheduled with [facility] and possible massage therapy.     Patient reports worsening memory issues. Sometimes he forgets where he is driving and forgets he scheduled certain appointments.     He was previously seen by Dr. Enmanuel De La Cruz [facility] [phone]. Advised to call for a follow up appointment with Dr. De La Cruz.      Future OV scheduled with Dr. Burnett.

## 2022-12-16 ENCOUNTER — OFFICE VISIT (OUTPATIENT)
Dept: INTERNAL MEDICINE | Facility: CLINIC | Age: 62
End: 2022-12-16
Payer: COMMERCIAL

## 2022-12-16 VITALS
TEMPERATURE: 98 F | HEART RATE: 61 BPM | SYSTOLIC BLOOD PRESSURE: 160 MMHG | WEIGHT: 247.5 LBS | DIASTOLIC BLOOD PRESSURE: 98 MMHG | BODY MASS INDEX: 32.65 KG/M2 | OXYGEN SATURATION: 93 %

## 2022-12-16 DIAGNOSIS — I10 HYPERTENSION, UNSPECIFIED TYPE: ICD-10-CM

## 2022-12-16 DIAGNOSIS — E03.9 HYPOTHYROIDISM, UNSPECIFIED TYPE: ICD-10-CM

## 2022-12-16 DIAGNOSIS — M25.511 RIGHT SHOULDER PAIN, UNSPECIFIED CHRONICITY: ICD-10-CM

## 2022-12-16 DIAGNOSIS — E78.5 HYPERLIPIDEMIA LDL GOAL <100: ICD-10-CM

## 2022-12-16 DIAGNOSIS — Z23 HIGH PRIORITY FOR 2019-NCOV VACCINE: ICD-10-CM

## 2022-12-16 DIAGNOSIS — R73.9 BLOOD GLUCOSE ELEVATED: ICD-10-CM

## 2022-12-16 DIAGNOSIS — T63.444A BEE STING REACTION, UNDETERMINED INTENT, INITIAL ENCOUNTER: ICD-10-CM

## 2022-12-16 DIAGNOSIS — R53.83 OTHER FATIGUE: ICD-10-CM

## 2022-12-16 LAB
FASTING STATUS PATIENT QL REPORTED: YES
GLUCOSE SERPL-MCNC: 127 MG/DL (ref 70–99)
HBA1C MFR BLD: 6.2 % (ref 0–5.6)
TSH SERPL DL<=0.005 MIU/L-ACNC: 4.2 UIU/ML (ref 0.3–4.2)

## 2022-12-16 PROCEDURE — 99214 OFFICE O/P EST MOD 30 MIN: CPT | Performed by: INTERNAL MEDICINE

## 2022-12-16 PROCEDURE — 91313 COVID-19 VACCINE BIVALENT BOOSTER 18+ (MODERNA): CPT | Performed by: INTERNAL MEDICINE

## 2022-12-16 PROCEDURE — 83036 HEMOGLOBIN GLYCOSYLATED A1C: CPT | Performed by: INTERNAL MEDICINE

## 2022-12-16 PROCEDURE — 0134A COVID-19 VACCINE BIVALENT BOOSTER 18+ (MODERNA): CPT | Performed by: INTERNAL MEDICINE

## 2022-12-16 PROCEDURE — 82947 ASSAY GLUCOSE BLOOD QUANT: CPT | Performed by: INTERNAL MEDICINE

## 2022-12-16 PROCEDURE — 36415 COLL VENOUS BLD VENIPUNCTURE: CPT | Performed by: INTERNAL MEDICINE

## 2022-12-16 PROCEDURE — 84443 ASSAY THYROID STIM HORMONE: CPT | Performed by: INTERNAL MEDICINE

## 2022-12-16 RX ORDER — AMLODIPINE AND BENAZEPRIL HYDROCHLORIDE 5; 20 MG/1; MG/1
1 CAPSULE ORAL DAILY
Qty: 30 CAPSULE | Refills: 11 | Status: SHIPPED | OUTPATIENT
Start: 2022-12-16 | End: 2023-03-20

## 2022-12-16 RX ORDER — EPINEPHRINE 0.3 MG/.3ML
0.3 INJECTION SUBCUTANEOUS PRN
Qty: 2 EACH | Refills: 3 | Status: SHIPPED | OUTPATIENT
Start: 2022-12-16 | End: 2023-03-20

## 2022-12-16 RX ORDER — LORAZEPAM 1 MG/1
TABLET ORAL
COMMUNITY
Start: 2022-06-28 | End: 2022-12-16

## 2022-12-16 NOTE — PATIENT INSTRUCTIONS
Amlodipine/ benazepril 5/20mg capsule, 1 take 1 capusle daily in AM for hypertension   Get a blood pressure recheck   in  3 weeks at the  [facility] pharmacy. You will need to go to [email or web address] to schedule the blood pressure check appointment.  Reduce calorie/carbohydrate (sugar, bread, potato, pasta, rice, alcohol etc)  intake in diet.  Increase color on your plate with vegetables. Increase  frequency of walking or other aerobic exercise as able (goal is daily)  Referral l to  Sleep clinic for probable sleep apnea contributing to blood pressure and memory issues   Continue therapy for your shoulder/chest   Moderna Covid vaccine today. Consider getting flu vaccine at pharmacy  Labs as ordered

## 2022-12-16 NOTE — PROGRESS NOTES
ASSESSMENT:   1. Hypertension, unspecified type  Uncontrolled.  Start Lotrel and elevation recheck in 3 weeks to pharmacy  - amLODIPine-benazepril (LOTREL) 5-20 MG capsule; Take 1 capsule by mouth daily  Dispense: 30 capsule; Refill: 11  - Adult Sleep Eval & Management  Referral; Future    2. Bee sting reaction, undetermined intent, initial encounter  History of anaphylactic bee sting reaction.  Prescription given to have available during the spring/summer/fall when these present  - EPINEPHrine (ANY BX GENERIC EQUIV) 0.3 MG/0.3ML injection 2-pack; Inject 0.3 mLs (0.3 mg) into the muscle as needed for anaphylaxis  Dispense: 2 each; Refill: 3    3. Other fatigue  Thyroid lab as ordered.  Recent liver, kidney and hemoglobin results normal.  Body habitus consistent with probable sleep apnea.  Will refer for sleep consultation  - Adult Sleep Eval & Management  Referral; Future  - TSH with free T4 reflex; Future  - TSH with free T4 reflex    4. Blood glucose elevated  Previous history mild glucose elevation.  Counseled regarding carbohydrate reduction and increase in exercise.  Labs as ordered.  If A1c greater than 6.5, will start metformin therapy  - Hemoglobin A1c; Future  - Glucose; Future  - Hemoglobin A1c  - Glucose    5. Hypothyroidism, unspecified type  History mild TSH elevation.  Not the point requiring prescription therapy previously.  Labs ordered  - TSH with free T4 reflex; Future  - TSH with free T4 reflex    6. Right shoulder pain, unspecified chronicity  Improved with physical therapy.  Continue management per [facility]    7. Hyperlipidemia LDL goal <100  Elevated CAD risk as below.  I will thyroid levels.  If thyroid function remains low, will correct and then reassess lipid status.  If thyroid function normal however, will then start statin therapy based on elevated CAD risk    8. High priority for 2019-nCoV vaccine  Candidate for COVID vaccination booster  - COVID-19,PF,MODERNA  BIVALENT 18+Yrs          PLAN:   Amlodipine/ benazepril 5/20mg capsule, 1 take 1 capusle daily in AM for hypertension   Get a blood pressure recheck   in  3 weeks at the  [facility] pharmacy. You will need to go to [email or web address] to schedule the blood pressure check appointment.  Reduce calorie/carbohydrate (sugar, bread, potato, pasta, rice, alcohol etc)  intake in diet.  Increase color on your plate with vegetables. Increase  frequency of walking or other aerobic exercise as able (goal is daily)  Referral  to  Sleep clinic for probable sleep apnea contributing to blood pressure and memory issues   Continue therapy for your shoulder/chest   Moderna Covid vaccine today. Consider getting flu vaccine at pharmacy  Labs as ordered  Epinephrine pen as needed for bee sting reaction.  Prescription refilled at pharmacy      (Chart documentation was completed, in part, with [employer] voice-recognition software. Even though reviewed, some grammatical, spelling, and word errors may remain.)    Hamilton Burnett MD  Internal Medicine Department  [facility]      Michael Isabel is a 62 year old, presenting for the following health issues:  No chief complaint on file.      History of Present Illness       Reason for visit:  Check   up    He eats 2-3 servings of fruits and vegetables daily.He consumes 0 sweetened beverage(s) daily.He exercises with enough effort to increase his heart rate 9 or less minutes per day.  He exercises with enough effort to increase his heart rate 3 or less days per week. He is missing 2 dose(s) of medications per week.      Most recent lab results reviewed with pt.         Component      Latest Ref Rng & Units 7/18/2022   Sodium      133 - 144 mmol/L 138   Potassium      3.4 - 5.3 mmol/L 4.2   Chloride      94 - 109 mmol/L 108   Carbon Dioxide      20 - 32 mmol/L 25   Anion Gap      3 - 14 mmol/L 5   Urea Nitrogen      7 - 30 mg/dL 14   Creatinine       0.66 - 1.25 mg/dL 0.93   Calcium      8.5 - 10.1 mg/dL 9.4   Glucose      70 - 99 mg/dL 123 (H)   Alkaline Phosphatase      40 - 150 U/L 63   AST      0 - 45 U/L 21   ALT      0 - 70 U/L 36   Protein Total      6.8 - 8.8 g/dL 8.0   Albumin      3.4 - 5.0 g/dL 4.3   Bilirubin Total      0.2 - 1.3 mg/dL 0.6   GFR Estimate      >60 mL/min/1.73m2 >90   WBC      4.0 - 11.0 10e3/uL 7.8   RBC Count      4.40 - 5.90 10e6/uL 5.29   Hemoglobin      13.3 - 17.7 g/dL 16.2   Hematocrit      40.0 - 53.0 % 48.4   MCV      78 - 100 fL 92   MCH      26.5 - 33.0 pg 30.6   MCHC      31.5 - 36.5 g/dL 33.5   RDW      10.0 - 15.0 % 13.3   Platelet Count      150 - 450 10e3/uL 239   Cholesterol      <200 mg/dL 228 (H)   Triglycerides      <150 mg/dL 317 (H)   HDL Cholesterol      >=40 mg/dL 39 (L)   LDL Cholesterol Calculated      <=100 mg/dL 126 (H)   Non HDL Cholesterol      <130 mg/dL 189 (H)   Patient Fasting > 8hrs?       Yes   TSH      0.40 - 4.00 mU/L 4.02 (H)   Vitamin B12      232 - 1,245 pg/mL 545   T4 Free      0.76 - 1.46 ng/dL 0.86       No identified additional risks  The 10-year ASCVD risk score (Mary[place] DK, et al., 2019) is: 23.4%    Values used to calculate the score:      Age: 62 years      Sex: Male      Is Non- : No      Diabetic: No      Tobacco smoker: No      Systolic Blood Pressure: 160 mmHg      Is BP treated: Yes      HDL Cholesterol: 39 mg/dL      Total Cholesterol: 228 mg/dL    History right shoulder/right upper chest symptoms.  Was seen at [facility].  See note in chart.  Started physical therapy and symptoms much better.  Able to abduct shoulder without pain.  Persistent elevated blood pressure. Denies chest pain, shortness of breath, abdominal pain, headache, vision changes.  Has some chronic daytime fatigue.  Occasional concentration issues.  Girlfriend states that patient snores per patient.  Unsure of apnea occurring  Weight up 12 pounds.  Eating more and  less exercise recently.  History  "of bee allergies with anaphylactic reaction.  Needs refill of EpiPen to have available in the spring      Additional ROS:   Constitutional, HEENT, Cardiovascular, Pulmonary, GI and , Neuro, MSK and Psych review of systems/symptoms are otherwise negative or unchanged from previous, except as noted above.      OBJECTIVE:  BP (!) 160/98   Pulse 61   Temp 98  F (36.7  C) (Temporal)   Wt 112.3 kg (247 lb 8 oz)   SpO2 93%   BMI 32.65 kg/m     Estimated body mass index is 32.65 kg/m  as calculated from the following:    Height as of 8/5/22: 1.854 m (6' 1\").    Weight as of this encounter: 112.3 kg (247 lb 8 oz).  Eye: PERRL, EOMI  HENT: ear canals and TM's normal and nose and mouth without ulcers or lesions.  Narrowed posterior pharyngeal airway due to obesity   Neck: no adenopathy. Thyroid normal to palpation. No bruits  Pulm: Lungs clear to auscultation   CV: Regular rates and rhythm  GI: Soft, obese, nontender, Normal active bowel sounds, No hepatosplenomegaly or masses palpable  Ext: Peripheral pulses intact. No edema.  Slight tenderness to full abduction right shoulder.  No tenderness to internal/external rotation  Neuro: Normal strength and tone, sensory exam grossly normal     "

## 2023-01-12 ENCOUNTER — ALLIED HEALTH/NURSE VISIT (OUTPATIENT)
Dept: INTERNAL MEDICINE | Facility: CLINIC | Age: 63
End: 2023-01-12

## 2023-01-12 VITALS — SYSTOLIC BLOOD PRESSURE: 138 MMHG | DIASTOLIC BLOOD PRESSURE: 78 MMHG

## 2023-01-12 DIAGNOSIS — Z01.30 BP CHECK: Primary | ICD-10-CM

## 2023-01-12 PROCEDURE — 99207 PR NO CHARGE NURSE ONLY: CPT | Performed by: INTERNAL MEDICINE

## 2023-01-12 NOTE — PROGRESS NOTES
Chalo Huynh was evaluated at [facility] on January 12, 2023 at which time his blood pressure was:    BP Readings from Last 3 Encounters:   01/12/23 138/78   12/16/22 (!) 160/98   11/16/22 (!) 154/96     Pulse Readings from Last 3 Encounters:   12/16/22 61   11/16/22 70   08/05/22 54       Reviewed lifestyle modifications for blood pressure control and reduction: including making healthy food choices, managing weight, getting regular exercise, smoking cessation, reducing alcohol consumption, monitoring blood pressure regularly.     Symptoms: None    BP Goal:< 140/90 mmHg    BP Assessment:  BP at goal    Potential Reasons for BP too high: NA - Not applicable    BP Follow-Up Plan: Recheck BP in 6 months at pharmacy    Recommendation to Provider: Continue current therapy    Note completed by: Praneeth Fischer, PharmD  [facility]  [phone]

## 2023-01-19 ENCOUNTER — ALLIED HEALTH/NURSE VISIT (OUTPATIENT)
Dept: INTERNAL MEDICINE | Facility: CLINIC | Age: 63
End: 2023-01-19
Payer: COMMERCIAL

## 2023-01-19 VITALS — DIASTOLIC BLOOD PRESSURE: 80 MMHG | SYSTOLIC BLOOD PRESSURE: 137 MMHG

## 2023-01-19 DIAGNOSIS — Z01.30 BP CHECK: Primary | ICD-10-CM

## 2023-01-19 PROCEDURE — 99207 PR NO CHARGE NURSE ONLY: CPT | Performed by: INTERNAL MEDICINE

## 2023-01-19 NOTE — PROGRESS NOTES
Chalo Huynh was evaluated at [facility] on January 19, 2023 at which time his blood pressure was:    BP Readings from Last 3 Encounters:   01/19/23 137/80   01/12/23 138/78   12/16/22 (!) 160/98     Pulse Readings from Last 3 Encounters:   12/16/22 61   11/16/22 70   08/05/22 54     No issues noted. Patient reports no side effects from amlodipine.    Symptoms: None    BP Goal:< 140/90 mmHg    BP Assessment:  BP at goal    Potential Reasons for BP too high: NA - Not applicable    BP Follow-Up Plan: Recheck BP in 6 months at pharmacy    Recommendation to Provider: None    Note completed by:     Brenda Proctor, PharmD  [facility]  [phone]

## 2023-02-02 ENCOUNTER — ALLIED HEALTH/NURSE VISIT (OUTPATIENT)
Dept: INTERNAL MEDICINE | Facility: CLINIC | Age: 63
End: 2023-02-02
Payer: COMMERCIAL

## 2023-02-02 VITALS — DIASTOLIC BLOOD PRESSURE: 80 MMHG | SYSTOLIC BLOOD PRESSURE: 124 MMHG

## 2023-02-02 DIAGNOSIS — Z01.30 BP CHECK: Primary | ICD-10-CM

## 2023-02-02 PROCEDURE — 99207 PR NO CHARGE NURSE ONLY: CPT | Performed by: INTERNAL MEDICINE

## 2023-02-02 NOTE — PROGRESS NOTES
Chalo Huynh was evaluated at [facility] on February 2, 2023 at which time his blood pressure was:    BP Readings from Last 3 Encounters:   02/02/23 124/80   01/19/23 137/80   01/12/23 138/78     Pulse Readings from Last 3 Encounters:   12/16/22 61   11/16/22 70   08/05/22 54       Reviewed lifestyle modifications for blood pressure control and reduction: including making healthy food choices, managing weight, getting regular exercise, smoking cessation, reducing alcohol consumption, monitoring blood pressure regularly.     Symptoms: None    BP Goal:< 140/90 mmHg    BP Assessment:  BP at goal    Potential Reasons for BP too high: NA - Not applicable    BP Follow-Up Plan: Recheck BP in 6 months at pharmacy    Recommendation to Provider: none     Note completed by: Layla Deng [facility]   [phone]

## 2023-02-13 ENCOUNTER — ALLIED HEALTH/NURSE VISIT (OUTPATIENT)
Dept: INTERNAL MEDICINE | Facility: CLINIC | Age: 63
End: 2023-02-13
Payer: COMMERCIAL

## 2023-02-13 VITALS — SYSTOLIC BLOOD PRESSURE: 120 MMHG | DIASTOLIC BLOOD PRESSURE: 72 MMHG

## 2023-02-13 DIAGNOSIS — Z01.30 BP CHECK: Primary | ICD-10-CM

## 2023-02-13 PROCEDURE — 99207 PR NO CHARGE NURSE ONLY: CPT | Performed by: INTERNAL MEDICINE

## 2023-02-13 NOTE — PROGRESS NOTES
Chalo Huynh was evaluated at [facility] on February 13, 2023 at which time his blood pressure was:    BP Readings from Last 3 Encounters:   02/13/23 120/72   02/02/23 124/80   01/19/23 137/80     Pulse Readings from Last 3 Encounters:   12/16/22 61   11/16/22 70   08/05/22 54       Reviewed lifestyle modifications for blood pressure control and reduction: including making healthy food choices, managing weight, getting regular exercise, smoking cessation, reducing alcohol consumption, monitoring blood pressure regularly.     Symptoms: None    BP Goal:< 140/90 mmHg    BP Assessment:  BP at goal    Potential Reasons for BP too high: NA - Not applicable    BP Follow-Up Plan: Recheck BP in 6 months at pharmacy    Recommendation to Provider: Continue current therapy    Note completed by: Praneeth Fischer, PharmD  [facility]  [phone]

## 2023-03-02 ENCOUNTER — ALLIED HEALTH/NURSE VISIT (OUTPATIENT)
Dept: INTERNAL MEDICINE | Facility: CLINIC | Age: 63
End: 2023-03-02
Payer: COMMERCIAL

## 2023-03-02 VITALS — SYSTOLIC BLOOD PRESSURE: 122 MMHG | DIASTOLIC BLOOD PRESSURE: 70 MMHG

## 2023-03-02 DIAGNOSIS — Z01.30 BP CHECK: Primary | ICD-10-CM

## 2023-03-02 PROCEDURE — 99207 PR NO CHARGE NURSE ONLY: CPT | Performed by: INTERNAL MEDICINE

## 2023-03-02 NOTE — PROGRESS NOTES
Chalo Huynh was evaluated at [facility] on March 2, 2023 at which time his blood pressure was:    BP Readings from Last 1 Encounters:   03/02/23 122/70     No data recorded      Reviewed lifestyle modifications for blood pressure control and reduction: including making healthy food choices, managing weight, getting regular exercise, smoking cessation, reducing alcohol consumption, monitoring blood pressure regularly.     Symptoms: None    BP Goal:< 140/90 mmHg    BP Assessment:  BP at goal    Potential Reasons for BP too high: NA - Not applicable    BP Follow-Up Plan: Recheck BP in 6 months at pharmacy    Recommendation to Provider: BP checked at pharmacy and noted to be at goal of <140/90.   Recommended patient follow-up in 6 months at the pharmacy.    Note completed by: Pancho Lord RPH

## 2023-03-20 ENCOUNTER — OFFICE VISIT (OUTPATIENT)
Dept: INTERNAL MEDICINE | Facility: CLINIC | Age: 63
End: 2023-03-20
Payer: COMMERCIAL

## 2023-03-20 VITALS
BODY MASS INDEX: 31.51 KG/M2 | HEART RATE: 56 BPM | OXYGEN SATURATION: 98 % | SYSTOLIC BLOOD PRESSURE: 127 MMHG | TEMPERATURE: 98.2 F | WEIGHT: 238.8 LBS | DIASTOLIC BLOOD PRESSURE: 80 MMHG

## 2023-03-20 DIAGNOSIS — T63.444A BEE STING REACTION, UNDETERMINED INTENT, INITIAL ENCOUNTER: ICD-10-CM

## 2023-03-20 DIAGNOSIS — R41.3 MEMORY LOSS: Primary | ICD-10-CM

## 2023-03-20 DIAGNOSIS — F10.10 EXCESSIVE DRINKING ALCOHOL: ICD-10-CM

## 2023-03-20 DIAGNOSIS — R06.83 SNORING: ICD-10-CM

## 2023-03-20 DIAGNOSIS — I83.90 SUPERFICIAL VARICOSITIES: ICD-10-CM

## 2023-03-20 DIAGNOSIS — I10 HYPERTENSION, UNSPECIFIED TYPE: ICD-10-CM

## 2023-03-20 LAB
ALBUMIN SERPL BCG-MCNC: 4.6 G/DL (ref 3.5–5.2)
ALP SERPL-CCNC: 65 U/L (ref 40–129)
ALT SERPL W P-5'-P-CCNC: 26 U/L (ref 10–50)
ANION GAP SERPL CALCULATED.3IONS-SCNC: 13 MMOL/L (ref 7–15)
AST SERPL W P-5'-P-CCNC: 20 U/L (ref 10–50)
BILIRUB SERPL-MCNC: 0.6 MG/DL
BUN SERPL-MCNC: 23.1 MG/DL (ref 8–23)
CALCIUM SERPL-MCNC: 9.8 MG/DL (ref 8.8–10.2)
CHLORIDE SERPL-SCNC: 105 MMOL/L (ref 98–107)
CREAT SERPL-MCNC: 1.03 MG/DL (ref 0.67–1.17)
DEPRECATED HCO3 PLAS-SCNC: 22 MMOL/L (ref 22–29)
ERYTHROCYTE [DISTWIDTH] IN BLOOD BY AUTOMATED COUNT: 13.3 % (ref 10–15)
GFR SERPL CREATININE-BSD FRML MDRD: 81 ML/MIN/1.73M2
GLUCOSE SERPL-MCNC: 115 MG/DL (ref 70–99)
HCT VFR BLD AUTO: 47.3 % (ref 40–53)
HGB BLD-MCNC: 15.8 G/DL (ref 13.3–17.7)
INR PPP: 1.09 (ref 0.85–1.15)
MCH RBC QN AUTO: 30.3 PG (ref 26.5–33)
MCHC RBC AUTO-ENTMCNC: 33.4 G/DL (ref 31.5–36.5)
MCV RBC AUTO: 91 FL (ref 78–100)
PLATELET # BLD AUTO: 243 10E3/UL (ref 150–450)
POTASSIUM SERPL-SCNC: 4.6 MMOL/L (ref 3.4–5.3)
PROT SERPL-MCNC: 8 G/DL (ref 6.4–8.3)
RBC # BLD AUTO: 5.21 10E6/UL (ref 4.4–5.9)
SODIUM SERPL-SCNC: 140 MMOL/L (ref 136–145)
TSH SERPL DL<=0.005 MIU/L-ACNC: 4.07 UIU/ML (ref 0.3–4.2)
VIT B12 SERPL-MCNC: 523 PG/ML (ref 232–1245)
WBC # BLD AUTO: 7 10E3/UL (ref 4–11)

## 2023-03-20 PROCEDURE — 85027 COMPLETE CBC AUTOMATED: CPT | Performed by: INTERNAL MEDICINE

## 2023-03-20 PROCEDURE — 84443 ASSAY THYROID STIM HORMONE: CPT | Performed by: INTERNAL MEDICINE

## 2023-03-20 PROCEDURE — 99214 OFFICE O/P EST MOD 30 MIN: CPT | Performed by: INTERNAL MEDICINE

## 2023-03-20 PROCEDURE — 85610 PROTHROMBIN TIME: CPT | Performed by: INTERNAL MEDICINE

## 2023-03-20 PROCEDURE — 82607 VITAMIN B-12: CPT | Performed by: INTERNAL MEDICINE

## 2023-03-20 PROCEDURE — 80053 COMPREHEN METABOLIC PANEL: CPT | Performed by: INTERNAL MEDICINE

## 2023-03-20 PROCEDURE — 36415 COLL VENOUS BLD VENIPUNCTURE: CPT | Performed by: INTERNAL MEDICINE

## 2023-03-20 RX ORDER — AMLODIPINE AND BENAZEPRIL HYDROCHLORIDE 5; 20 MG/1; MG/1
1 CAPSULE ORAL DAILY
Qty: 90 CAPSULE | Refills: 3 | Status: SHIPPED | OUTPATIENT
Start: 2023-03-20 | End: 2024-05-03

## 2023-03-20 RX ORDER — EPINEPHRINE 0.3 MG/.3ML
0.3 INJECTION SUBCUTANEOUS PRN
Qty: 2 EACH | Refills: 3 | Status: SHIPPED | OUTPATIENT
Start: 2023-03-20 | End: 2024-05-22
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
## 2023-03-24 ENCOUNTER — HOSPITAL ENCOUNTER (OUTPATIENT)
Dept: ULTRASOUND IMAGING | Facility: CLINIC | Age: 63
Discharge: HOME | End: 2023-03-24
Attending: INTERNAL MEDICINE | Admitting: INTERNAL MEDICINE
Payer: COMMERCIAL

## 2023-03-24 DIAGNOSIS — F10.10 EXCESSIVE DRINKING ALCOHOL: ICD-10-CM

## 2023-03-24 DIAGNOSIS — I83.90 SUPERFICIAL VARICOSITIES: ICD-10-CM

## 2023-03-24 PROCEDURE — 76700 US EXAM ABDOM COMPLETE: CPT

## 2023-03-25 ENCOUNTER — NURSE TRIAGE (OUTPATIENT)
Dept: NURSING | Facility: CLINIC | Age: 63
End: 2023-03-25
Payer: COMMERCIAL

## 2023-03-25 NOTE — TELEPHONE ENCOUNTER
Nurse Triage SBAR    Is this a 2nd Level Triage? NO  Request for Lab Results    Situation:  Pt calls for lab results from OV of 3/20/23.  No provider comments noted as yet.  Therefore routing request for results to clinic care team.    Please call pt with results interpretation and any next steps.  Pt's callback # -> [phone]     Thank you-    [facility] Nurse Advisor     Reason for Disposition    Caller requesting an appointment, triage offered and declined    Protocols used: PCP CALL - NO TRIAGE-A-

## 2023-04-24 ENCOUNTER — ALLIED HEALTH/NURSE VISIT (OUTPATIENT)
Dept: INTERNAL MEDICINE | Facility: CLINIC | Age: 63
End: 2023-04-24
Payer: COMMERCIAL

## 2023-04-24 VITALS — SYSTOLIC BLOOD PRESSURE: 130 MMHG | DIASTOLIC BLOOD PRESSURE: 58 MMHG

## 2023-04-24 DIAGNOSIS — Z01.30 BP CHECK: Primary | ICD-10-CM

## 2023-04-24 PROCEDURE — 99207 PR NO CHARGE NURSE ONLY: CPT | Performed by: INTERNAL MEDICINE

## 2023-04-24 NOTE — PROGRESS NOTES
Chalo Huynh was evaluated at [facility] on April 24, 2023 at which time his blood pressure was:    BP Readings from Last 3 Encounters:   04/24/23 130/58   03/20/23 127/80   03/02/23 122/70     Pulse Readings from Last 3 Encounters:   03/20/23 56   12/16/22 61   11/16/22 70       Reviewed lifestyle modifications for blood pressure control and reduction: including making healthy food choices, managing weight, getting regular exercise, smoking cessation, reducing alcohol consumption, monitoring blood pressure regularly.     Symptoms: None Patient denies experiencing symptoms of hypertension (dizziness, lightheadedness, blurry visions, shortness of breath). He is tolerating lotrel well without any side effects (swelling, dry cough).     BP Goal:< 140/90 mmHg    BP Assessment:  BP at goal    Potential Reasons for BP too high: NA - Not applicable    BP Follow-Up Plan: Recheck BP in 6 months at pharmacy    Recommendation to Provider: Patient seemed confused about when to refill his medication. He does not remember when he last filled his medication. He said he was taking one capsule daily but only 3 was left in the bottle that was last filled on the first week of April. His blood pressure is at goal- no recommendation at this time.     Note completed by: STACEY Payne student

## 2023-04-27 ENCOUNTER — TELEPHONE (OUTPATIENT)
Dept: INTERNAL MEDICINE | Facility: CLINIC | Age: 63
End: 2023-04-27
Payer: COMMERCIAL

## 2023-06-13 ENCOUNTER — ALLIED HEALTH/NURSE VISIT (OUTPATIENT)
Dept: INTERNAL MEDICINE | Facility: CLINIC | Age: 63
End: 2023-06-13
Payer: COMMERCIAL

## 2023-06-13 VITALS — DIASTOLIC BLOOD PRESSURE: 74 MMHG | SYSTOLIC BLOOD PRESSURE: 134 MMHG

## 2023-06-13 DIAGNOSIS — Z01.30 BP CHECK: Primary | ICD-10-CM

## 2023-06-13 PROCEDURE — 99207 PR NO CHARGE NURSE ONLY: CPT | Performed by: INTERNAL MEDICINE

## 2023-06-13 NOTE — PROGRESS NOTES
Chalo Huynh was evaluated at [facility] on June 13, 2023 at which time his blood pressure was:    BP Readings from Last 1 Encounters:   06/13/23 134/74     No data recorded      Reviewed lifestyle modifications for blood pressure control and reduction: including making healthy food choices, managing weight, getting regular exercise, smoking cessation, reducing alcohol consumption, monitoring blood pressure regularly.     Symptoms: None    BP Goal:< 140/90 mmHg    BP Assessment:  BP at goal    Potential Reasons for BP too high: NA - Not applicable    BP Follow-Up Plan: Recheck BP in 6 months at pharmacy    Recommendation to Provider: None at this time    Note completed by: Sarah Luciano RPH

## 2023-12-12 ENCOUNTER — ALLIED HEALTH/NURSE VISIT (OUTPATIENT)
Dept: INTERNAL MEDICINE | Facility: CLINIC | Age: 63
End: 2023-12-12
Payer: COMMERCIAL

## 2023-12-12 VITALS — SYSTOLIC BLOOD PRESSURE: 138 MMHG | DIASTOLIC BLOOD PRESSURE: 70 MMHG

## 2023-12-12 DIAGNOSIS — Z01.30 BP CHECK: Primary | ICD-10-CM

## 2023-12-12 PROCEDURE — 99207 PR NO CHARGE NURSE ONLY: CPT | Performed by: INTERNAL MEDICINE

## 2023-12-12 NOTE — PROGRESS NOTES
Chalo Huynh was evaluated at [facility] on December 12, 2023 at which time his blood pressure was:    BP Readings from Last 1 Encounters:   12/12/23 138/70     No data recorded      Reviewed lifestyle modifications for blood pressure control and reduction: including making healthy food choices, managing weight, getting regular exercise, smoking cessation, reducing alcohol consumption, monitoring blood pressure regularly.     Symptoms: None    BP Goal:< 140/90 mmHg    BP Assessment:  BP at goal    Potential Reasons for BP too high: NA - Not applicable    BP Follow-Up Plan: Recheck BP in 6 months at pharmacy    Recommendation to Provider: BP checked at pharmacy and noted to be at goal of <140/90.   Recommended patient continue current therapy and follow-up in 6 months at the pharmacy.    Note completed by: Pancho Lord RPH

## 2024-01-22 ENCOUNTER — OFFICE VISIT (OUTPATIENT)
Dept: URGENT CARE | Facility: URGENT CARE | Age: 64
End: 2024-01-22
Payer: COMMERCIAL

## 2024-01-22 ENCOUNTER — ALLIED HEALTH/NURSE VISIT (OUTPATIENT)
Dept: INTERNAL MEDICINE | Facility: CLINIC | Age: 64
End: 2024-01-22
Payer: COMMERCIAL

## 2024-01-22 VITALS
HEART RATE: 73 BPM | BODY MASS INDEX: 32.46 KG/M2 | SYSTOLIC BLOOD PRESSURE: 148 MMHG | OXYGEN SATURATION: 94 % | RESPIRATION RATE: 16 BRPM | WEIGHT: 246 LBS | TEMPERATURE: 97.9 F | DIASTOLIC BLOOD PRESSURE: 91 MMHG

## 2024-01-22 VITALS — DIASTOLIC BLOOD PRESSURE: 74 MMHG | SYSTOLIC BLOOD PRESSURE: 136 MMHG

## 2024-01-22 DIAGNOSIS — H90.5 SENSORINEURAL HEARING LOSS (SNHL) OF RIGHT EAR, UNSPECIFIED HEARING STATUS ON CONTRALATERAL SIDE: ICD-10-CM

## 2024-01-22 DIAGNOSIS — Z01.30 BP CHECK: Primary | ICD-10-CM

## 2024-01-22 DIAGNOSIS — Z87.09 HISTORY OF URI (UPPER RESPIRATORY INFECTION): Primary | ICD-10-CM

## 2024-01-22 DIAGNOSIS — I10 BENIGN ESSENTIAL HYPERTENSION: ICD-10-CM

## 2024-01-22 PROCEDURE — 99207 PR NO CHARGE NURSE ONLY: CPT | Performed by: INTERNAL MEDICINE

## 2024-01-22 PROCEDURE — 99214 OFFICE O/P EST MOD 30 MIN: CPT | Performed by: PHYSICIAN ASSISTANT

## 2024-01-22 NOTE — PROGRESS NOTES
Chalo Huynh was evaluated at [facility] on January 22, 2024 at which time his blood pressure was:    BP Readings from Last 1 Encounters:   01/22/24 136/74     Reviewed lifestyle modifications for blood pressure control and reduction: including making healthy food choices, managing weight, getting regular exercise, smoking cessation, reducing alcohol consumption, monitoring blood pressure regularly.     Symptoms: None    BP Goal:< 140/90 mmHg    BP Assessment:  BP at goal    Potential Reasons for BP too high: NA - Not applicable    BP Follow-Up Plan: Recheck BP in 6 months at pharmacy    Recommendation to Provider: BP checked at pharmacy and noted to be at goal of <140/90. Recommended patient continue current therapy and follow-up in 6 months at the pharmacy.    Note completed by: Maria Dolores Michel, 4th Year Student Pharmacist

## 2024-01-22 NOTE — PROGRESS NOTES
Assessment & Plan     History of URI (upper respiratory infection)    You have been diagnosed with a viral URI.  A viral respiratory infection is an infection of the nose, sinuses, or throat caused by a virus. Colds and the flu are common types of viral respiratory infections.  The symptoms of a viral respiratory infection often start quickly. They include a fever, sore throat, and runny nose. You may also just not feel well. Or you may not want to eat much.  Most viral infections can be treated with home care. This may include drinking lots of fluids and taking over-the-counter pain medicine. You will probably feel better in 4 to 10 days.  Antibiotics are not used to treat a viral infection. Antibiotics don't kill viruses, so they won't help cure a viral illness.  Since this cold he has lost almost complete hearing in right ear    Sensorineural hearing loss (SNHL) of right ear, unspecified hearing status on contralateral side    Right ear has almost 100% hearing loss  This has been present for awhile  Patient was referred and scheduled an appt today for his hearing losss    - Adult ENT  Referral; Future    Benign essential hypertension    Patient advised to follow up with PCP for recheck blood pressure   Information given to patient on diet modifications, including lowering salt in diet, decrease calories, weight loss and exercise.  Monitor blood pressure at home and if BP maintains over 140/90 then advise having a recheck with PCP in 2 weeks.       Review of external notes as documented elsewhere in note    CONSULTATION/REFERRAL to ENT    No follow-ups on file.    Michael Isabel is a 63 year old, presenting for the following health issues:  Urgent Care and Cerumen Impaction (Plugged ears, can't ear out of Right ear x 1 month - denies pain)    HPI     Review of Systems  Constitutional, neuro, ENT, endocrine, pulmonary, cardiac, gastrointestinal, genitourinary, musculoskeletal, integument and  psychiatric systems are negative, except as otherwise noted.      Objective    BP (!) 148/91 (BP Location: Left arm, Patient Position: Sitting, Cuff Size: Adult Large)   Pulse 73   Temp 97.9  F (36.6  C) (Tympanic)   Resp 16   Wt 111.6 kg (246 lb)   SpO2 94%   BMI 32.46 kg/m    Body mass index is 32.46 kg/m .  Physical Exam   GENERAL: alert and no distress  EYES: Eyes grossly normal to inspection, PERRL and conjunctivae and sclerae normal  HENT: ear canals and TM's normal, nose and mouth without ulcers or lesions  NECK: no adenopathy, no asymmetry, masses, or scars  RESP: lungs clear to auscultation - no rales, rhonchi or wheezes  CV: regular rate and rhythm, normal S1 S2, no S3 or S4, no murmur, click or rub, no peripheral edema  MS: no gross musculoskeletal defects noted, no edema  SKIN: no suspicious lesions or rashes  NEURO: Normal strength and tone, mentation intact and speech normal  PSYCH: mentation appears normal, affect normal/bright  LYMPH: no cervical, supraclavicular, axillary, or inguinal adenopathy            Signed Electronically by: Axel Ferrer, [facility], PAJOSE L

## 2024-01-23 NOTE — TELEPHONE ENCOUNTER
"FUTURE VISIT INFORMATION      FUTURE VISIT INFORMATION:  Date: 4/18/24  Time: 11:20 AM  Location: [facility]  REFERRAL INFORMATION:  Referring provider:    Referring providers clinic:    Reason for visit/diagnosis:  per pt, refd Axel Ferrer, Sensorineural hearing loss (SNHL) of right ear, unspecified hearing status on contralateral side. [facility] confd     RECORDS REQUESTED FROM      Clinic name Comments Records Status Imaging Status   [facility] 1/22/23 referral/ OV with Axel Ferrer, YULIANA  Epic            \"Please notify/message CSS if patient completed outside imaging prior to scheduled appointment and/or any outside records that might have been missed at pre visit -Thank you\"  "

## 2024-04-11 DIAGNOSIS — Z01.10 ENCOUNTER FOR HEARING TEST: Primary | ICD-10-CM

## 2024-04-18 ENCOUNTER — PRE VISIT (OUTPATIENT)
Dept: OTOLARYNGOLOGY | Facility: CLINIC | Age: 64
End: 2024-04-18

## 2024-05-21 ENCOUNTER — ALLIED HEALTH/NURSE VISIT (OUTPATIENT)
Dept: INTERNAL MEDICINE | Facility: CLINIC | Age: 64
End: 2024-05-21
Payer: COMMERCIAL

## 2024-05-21 DIAGNOSIS — Z01.30 BP CHECK: Primary | ICD-10-CM

## 2024-05-21 PROCEDURE — 99207 PR NO CHARGE NURSE ONLY: CPT | Performed by: INTERNAL MEDICINE

## 2024-05-21 NOTE — PROGRESS NOTES
Chalo Huynh was evaluated at [facility] on May 21, 2024 at which time his blood pressure was:    BP Readings from Last 3 Encounters:   01/22/24 (!) 148/91   01/22/24 136/74   12/12/23 138/70     Pulse Readings from Last 3 Encounters:   01/22/24 73   03/20/23 56   12/16/22 61       Reviewed lifestyle modifications for blood pressure control and reduction: including making healthy food choices, managing weight, getting regular exercise, smoking cessation, reducing alcohol consumption, monitoring blood pressure regularly.     Symptoms: None    BP Goal:< 140/90 mmHg    BP Assessment:  BP too high    Potential Reasons for BP too high: NA - Not applicable    BP Follow-Up Plan: Recheck BP in 30 days at pharmacy    Recommendation to Provider: Increase Lotrel dose to 10/20mg and have pt recheck BP in pharmacy/clinic within 30 days.    Note completed by: Yamil Allen, PharmD  Float Pharmacist   [facility]  [address] [place] [address]  P: [phone]

## 2024-05-22 DIAGNOSIS — T63.444A BEE STING REACTION, UNDETERMINED INTENT, INITIAL ENCOUNTER: ICD-10-CM

## 2024-05-23 ENCOUNTER — TELEPHONE (OUTPATIENT)
Dept: INTERNAL MEDICINE | Facility: CLINIC | Age: 64
End: 2024-05-23

## 2024-05-23 RX ORDER — EPINEPHRINE 0.3 MG/.3ML
0.3 INJECTION SUBCUTANEOUS PRN
Qty: 2 EACH | Refills: 3 | Status: SHIPPED | OUTPATIENT
Start: 2024-05-23

## 2024-05-24 NOTE — CONFIDENTIAL NOTE
Noted. See TE requesting pt be seen in next 6 weeks for follow-up. Will discuss compliance with  med use, etc and make rec at that time re: blood pressure management. Over 1 year since last seen

## 2024-05-24 NOTE — TELEPHONE ENCOUNTER
Over 1 year since pt seen last in clinic. Recent BP in pharmacy elevated. Call pt and assist in schedle follow-up appt with me in the next 6 weeks in clinic. OK to use [facility] appt slot or same day appt slot for appt with me

## 2024-05-29 NOTE — TELEPHONE ENCOUNTER
PASCUAL ([phone] @ 1:03pm 5/29) to CB and schedule an IN PERSON visit with Dr. Burnett per Dr. Burnett's request to discuss elevated BP. Ok to work in within the next 6 weeks and ok to use [facility] Rel OR Same Day slot per PCP previous message.

## 2024-06-01 DIAGNOSIS — T63.444A BEE STING REACTION, UNDETERMINED INTENT, INITIAL ENCOUNTER: ICD-10-CM

## 2024-06-03 ENCOUNTER — ALLIED HEALTH/NURSE VISIT (OUTPATIENT)
Dept: INTERNAL MEDICINE | Facility: CLINIC | Age: 64
End: 2024-06-03
Payer: COMMERCIAL

## 2024-06-03 VITALS — DIASTOLIC BLOOD PRESSURE: 78 MMHG | SYSTOLIC BLOOD PRESSURE: 156 MMHG

## 2024-06-03 DIAGNOSIS — Z01.30 BP CHECK: Primary | ICD-10-CM

## 2024-06-03 PROCEDURE — 99207 PR NO CHARGE NURSE ONLY: CPT | Performed by: INTERNAL MEDICINE

## 2024-06-03 RX ORDER — EPINEPHRINE 0.3 MG/.3ML
INJECTION SUBCUTANEOUS
OUTPATIENT
Start: 2024-06-03

## 2024-06-03 NOTE — TELEPHONE ENCOUNTER
LVM for patient to call back and schedule with PCP. Please refer to provider notes below on call back

## 2024-06-03 NOTE — PROGRESS NOTES
Chalo Huynh was evaluated at [facility] on Lien 3, 2024 at which time his blood pressure was:    BP Readings from Last 3 Encounters:   06/03/24 (!) 156/78   01/22/24 (!) 148/91   01/22/24 136/74     Pulse Readings from Last 3 Encounters:   01/22/24 73   03/20/23 56   12/16/22 61       Reviewed lifestyle modifications for blood pressure control and reduction: including making healthy food choices, managing weight, getting regular exercise, smoking cessation, reducing alcohol consumption, monitoring blood pressure regularly.     Symptoms: None    BP Goal:< 140/90 mmHg    BP Assessment:  BP too high    Potential Reasons for BP too high: NA - Not applicable    BP Follow-Up Plan: Recheck BP in 30 days at pharmacy    Recommendation to Provider: Pt currently taking amlodipine/benazepril 5-20mg daily. Would recommend increasing dose to 10-40mg daily.     Note completed by: Arlene Rousseau, PharmD student

## 2024-06-05 NOTE — CONFIDENTIAL NOTE
See TE 5/23/24. Messages have been left with pt to call back and get appt with me to discuss management further. >  1 year since seen last

## 2024-06-18 NOTE — TELEPHONE ENCOUNTER
LVM for patient to call back and schedule appt with PCP. Please refer to notes below for scheduling.

## 2024-06-21 ENCOUNTER — HOSPITAL ENCOUNTER (OUTPATIENT)
Dept: CT IMAGING | Facility: CLINIC | Age: 64
Discharge: HOME | DRG: 392 | End: 2024-06-21
Attending: FAMILY MEDICINE | Admitting: FAMILY MEDICINE
Payer: COMMERCIAL

## 2024-06-21 ENCOUNTER — OFFICE VISIT (OUTPATIENT)
Dept: URGENT CARE | Facility: URGENT CARE | Age: 64
End: 2024-06-21
Payer: COMMERCIAL

## 2024-06-21 ENCOUNTER — HOSPITAL ENCOUNTER (INPATIENT)
Facility: CLINIC | Age: 64
LOS: 1 days | Discharge: HOME | DRG: 392 | End: 2024-06-22
Attending: EMERGENCY MEDICINE | Admitting: HOSPITALIST
Payer: COMMERCIAL

## 2024-06-21 ENCOUNTER — ANCILLARY PROCEDURE (OUTPATIENT)
Dept: GENERAL RADIOLOGY | Facility: CLINIC | Age: 64
End: 2024-06-21
Attending: FAMILY MEDICINE
Payer: COMMERCIAL

## 2024-06-21 VITALS
HEART RATE: 68 BPM | BODY MASS INDEX: 31.14 KG/M2 | DIASTOLIC BLOOD PRESSURE: 82 MMHG | WEIGHT: 236 LBS | OXYGEN SATURATION: 97 % | SYSTOLIC BLOOD PRESSURE: 145 MMHG | TEMPERATURE: 97.3 F

## 2024-06-21 DIAGNOSIS — D72.829 LEUKOCYTOSIS, UNSPECIFIED TYPE: ICD-10-CM

## 2024-06-21 DIAGNOSIS — K57.80 PERFORATED DIVERTICULUM OF INTESTINE: Primary | ICD-10-CM

## 2024-06-21 DIAGNOSIS — R10.30 LOWER ABDOMINAL PAIN: ICD-10-CM

## 2024-06-21 DIAGNOSIS — K57.20 DIVERTICULITIS OF COLON WITH PERFORATION: ICD-10-CM

## 2024-06-21 LAB
ALBUMIN SERPL BCG-MCNC: 4.5 G/DL (ref 3.5–5.2)
ALBUMIN UR-MCNC: ABNORMAL MG/DL
ALP SERPL-CCNC: 83 U/L (ref 40–150)
ALT SERPL W P-5'-P-CCNC: 24 U/L (ref 0–70)
ANION GAP SERPL CALCULATED.3IONS-SCNC: 14 MMOL/L (ref 7–15)
APPEARANCE UR: CLEAR
AST SERPL W P-5'-P-CCNC: 20 U/L (ref 0–45)
BASOPHILS # BLD AUTO: 0 10E3/UL (ref 0–0.2)
BASOPHILS # BLD AUTO: 0 10E3/UL (ref 0–0.2)
BASOPHILS NFR BLD AUTO: 0 %
BASOPHILS NFR BLD AUTO: 0 %
BILIRUB SERPL-MCNC: 1.1 MG/DL
BILIRUB UR QL STRIP: NEGATIVE
BUN SERPL-MCNC: 13.1 MG/DL (ref 8–23)
CALCIUM SERPL-MCNC: 9.3 MG/DL (ref 8.8–10.2)
CHLORIDE SERPL-SCNC: 100 MMOL/L (ref 98–107)
COLOR UR AUTO: YELLOW
CREAT SERPL-MCNC: 1.12 MG/DL (ref 0.67–1.17)
DEPRECATED HCO3 PLAS-SCNC: 21 MMOL/L (ref 22–29)
EGFRCR SERPLBLD CKD-EPI 2021: 73 ML/MIN/1.73M2
EOSINOPHIL # BLD AUTO: 0.1 10E3/UL (ref 0–0.7)
EOSINOPHIL # BLD AUTO: 0.1 10E3/UL (ref 0–0.7)
EOSINOPHIL NFR BLD AUTO: 1 %
EOSINOPHIL NFR BLD AUTO: 1 %
ERYTHROCYTE [DISTWIDTH] IN BLOOD BY AUTOMATED COUNT: 13 % (ref 10–15)
ERYTHROCYTE [DISTWIDTH] IN BLOOD BY AUTOMATED COUNT: 13.1 % (ref 10–15)
GLUCOSE SERPL-MCNC: 115 MG/DL (ref 70–99)
GLUCOSE UR STRIP-MCNC: NEGATIVE MG/DL
HCT VFR BLD AUTO: 45.8 % (ref 40–53)
HCT VFR BLD AUTO: 47.2 % (ref 40–53)
HGB BLD-MCNC: 15.3 G/DL (ref 13.3–17.7)
HGB BLD-MCNC: 15.3 G/DL (ref 13.3–17.7)
HGB UR QL STRIP: NEGATIVE
IMM GRANULOCYTES # BLD: 0 10E3/UL
IMM GRANULOCYTES # BLD: 0.1 10E3/UL
IMM GRANULOCYTES NFR BLD: 0 %
IMM GRANULOCYTES NFR BLD: 0 %
KETONES UR STRIP-MCNC: 40 MG/DL
LACTATE SERPL-SCNC: 1 MMOL/L (ref 0.7–2)
LEUKOCYTE ESTERASE UR QL STRIP: NEGATIVE
LYMPHOCYTES # BLD AUTO: 2.2 10E3/UL (ref 0.8–5.3)
LYMPHOCYTES # BLD AUTO: 2.3 10E3/UL (ref 0.8–5.3)
LYMPHOCYTES NFR BLD AUTO: 16 %
LYMPHOCYTES NFR BLD AUTO: 19 %
MCH RBC QN AUTO: 30.4 PG (ref 26.5–33)
MCH RBC QN AUTO: 30.8 PG (ref 26.5–33)
MCHC RBC AUTO-ENTMCNC: 32.4 G/DL (ref 31.5–36.5)
MCHC RBC AUTO-ENTMCNC: 33.4 G/DL (ref 31.5–36.5)
MCV RBC AUTO: 92 FL (ref 78–100)
MCV RBC AUTO: 94 FL (ref 78–100)
MONOCYTES # BLD AUTO: 0.9 10E3/UL (ref 0–1.3)
MONOCYTES # BLD AUTO: 1.1 10E3/UL (ref 0–1.3)
MONOCYTES NFR BLD AUTO: 8 %
MONOCYTES NFR BLD AUTO: 8 %
NEUTROPHILS # BLD AUTO: 10.6 10E3/UL (ref 1.6–8.3)
NEUTROPHILS # BLD AUTO: 8.4 10E3/UL (ref 1.6–8.3)
NEUTROPHILS NFR BLD AUTO: 73 %
NEUTROPHILS NFR BLD AUTO: 75 %
NITRATE UR QL: NEGATIVE
NRBC # BLD AUTO: 0 10E3/UL
NRBC BLD AUTO-RTO: 0 /100
PH UR STRIP: 6 [PH] (ref 5–7)
PLATELET # BLD AUTO: 225 10E3/UL (ref 150–450)
PLATELET # BLD AUTO: 234 10E3/UL (ref 150–450)
POTASSIUM SERPL-SCNC: 4 MMOL/L (ref 3.4–5.3)
PROT SERPL-MCNC: 8.3 G/DL (ref 6.4–8.3)
RADIOLOGIST FLAGS: ABNORMAL
RBC # BLD AUTO: 4.97 10E6/UL (ref 4.4–5.9)
RBC # BLD AUTO: 5.04 10E6/UL (ref 4.4–5.9)
RBC #/AREA URNS AUTO: NORMAL /HPF
SODIUM SERPL-SCNC: 135 MMOL/L (ref 135–145)
SP GR UR STRIP: >=1.03 (ref 1–1.03)
UROBILINOGEN UR STRIP-ACNC: 1 E.U./DL
WBC # BLD AUTO: 11.5 10E3/UL (ref 4–11)
WBC # BLD AUTO: 14.2 10E3/UL (ref 4–11)
WBC #/AREA URNS AUTO: NORMAL /HPF

## 2024-06-21 PROCEDURE — 250N000011 HC RX IP 250 OP 636: Performed by: FAMILY MEDICINE

## 2024-06-21 PROCEDURE — 74177 CT ABD & PELVIS W/CONTRAST: CPT

## 2024-06-21 PROCEDURE — 99285 EMERGENCY DEPT VISIT HI MDM: CPT | Mod: 25

## 2024-06-21 PROCEDURE — G0378 HOSPITAL OBSERVATION PER HR: HCPCS

## 2024-06-21 PROCEDURE — 250N000009 HC RX 250: Performed by: FAMILY MEDICINE

## 2024-06-21 PROCEDURE — 99207 PR INPT ADMISSION FROM CLINIC: CPT | Performed by: FAMILY MEDICINE

## 2024-06-21 PROCEDURE — 85025 COMPLETE CBC W/AUTO DIFF WBC: CPT | Performed by: FAMILY MEDICINE

## 2024-06-21 PROCEDURE — 96365 THER/PROPH/DIAG IV INF INIT: CPT | Mod: 59

## 2024-06-21 PROCEDURE — 99222 1ST HOSP IP/OBS MODERATE 55: CPT | Performed by: HOSPITALIST

## 2024-06-21 PROCEDURE — 120N000001 HC R&B MED SURG/OB

## 2024-06-21 PROCEDURE — 81001 URINALYSIS AUTO W/SCOPE: CPT | Performed by: FAMILY MEDICINE

## 2024-06-21 PROCEDURE — 87040 BLOOD CULTURE FOR BACTERIA: CPT | Performed by: HOSPITALIST

## 2024-06-21 PROCEDURE — 36415 COLL VENOUS BLD VENIPUNCTURE: CPT | Performed by: FAMILY MEDICINE

## 2024-06-21 PROCEDURE — 83605 ASSAY OF LACTIC ACID: CPT | Performed by: EMERGENCY MEDICINE

## 2024-06-21 PROCEDURE — 85025 COMPLETE CBC W/AUTO DIFF WBC: CPT | Performed by: EMERGENCY MEDICINE

## 2024-06-21 PROCEDURE — 80053 COMPREHEN METABOLIC PANEL: CPT | Performed by: EMERGENCY MEDICINE

## 2024-06-21 PROCEDURE — 36415 COLL VENOUS BLD VENIPUNCTURE: CPT | Performed by: HOSPITALIST

## 2024-06-21 PROCEDURE — 36415 COLL VENOUS BLD VENIPUNCTURE: CPT | Performed by: EMERGENCY MEDICINE

## 2024-06-21 PROCEDURE — 74019 RADEX ABDOMEN 2 VIEWS: CPT | Mod: TC | Performed by: RADIOLOGY

## 2024-06-21 PROCEDURE — 250N000011 HC RX IP 250 OP 636: Mod: JZ | Performed by: EMERGENCY MEDICINE

## 2024-06-21 PROCEDURE — 96366 THER/PROPH/DIAG IV INF ADDON: CPT

## 2024-06-21 RX ORDER — HYDROMORPHONE HCL IN WATER/PF 6 MG/30 ML
0.4 PATIENT CONTROLLED ANALGESIA SYRINGE INTRAVENOUS
Status: DISCONTINUED | OUTPATIENT
Start: 2024-06-21 | End: 2024-06-22 | Stop reason: HOSPADM

## 2024-06-21 RX ORDER — IOPAMIDOL 755 MG/ML
116 INJECTION, SOLUTION INTRAVASCULAR ONCE
Status: COMPLETED | OUTPATIENT
Start: 2024-06-21 | End: 2024-06-21

## 2024-06-21 RX ORDER — CALCIUM CARBONATE 500 MG/1
1000 TABLET, CHEWABLE ORAL 4 TIMES DAILY PRN
Status: DISCONTINUED | OUTPATIENT
Start: 2024-06-21 | End: 2024-06-22 | Stop reason: HOSPADM

## 2024-06-21 RX ORDER — NALOXONE HYDROCHLORIDE 0.4 MG/ML
0.2 INJECTION, SOLUTION INTRAMUSCULAR; INTRAVENOUS; SUBCUTANEOUS
Status: DISCONTINUED | OUTPATIENT
Start: 2024-06-21 | End: 2024-06-22 | Stop reason: HOSPADM

## 2024-06-21 RX ORDER — LIDOCAINE 40 MG/G
CREAM TOPICAL
Status: DISCONTINUED | OUTPATIENT
Start: 2024-06-21 | End: 2024-06-22 | Stop reason: HOSPADM

## 2024-06-21 RX ORDER — HYDROMORPHONE HCL IN WATER/PF 6 MG/30 ML
0.2 PATIENT CONTROLLED ANALGESIA SYRINGE INTRAVENOUS
Status: DISCONTINUED | OUTPATIENT
Start: 2024-06-21 | End: 2024-06-22 | Stop reason: HOSPADM

## 2024-06-21 RX ORDER — HYDRALAZINE HYDROCHLORIDE 20 MG/ML
10 INJECTION INTRAMUSCULAR; INTRAVENOUS EVERY 4 HOURS PRN
Status: DISCONTINUED | OUTPATIENT
Start: 2024-06-21 | End: 2024-06-22 | Stop reason: HOSPADM

## 2024-06-21 RX ORDER — ACETAMINOPHEN 650 MG/1
650 SUPPOSITORY RECTAL EVERY 4 HOURS PRN
Status: DISCONTINUED | OUTPATIENT
Start: 2024-06-21 | End: 2024-06-22 | Stop reason: HOSPADM

## 2024-06-21 RX ORDER — PIPERACILLIN SODIUM, TAZOBACTAM SODIUM 3; .375 G/15ML; G/15ML
3.38 INJECTION, POWDER, LYOPHILIZED, FOR SOLUTION INTRAVENOUS EVERY 6 HOURS
Status: DISCONTINUED | OUTPATIENT
Start: 2024-06-21 | End: 2024-06-22 | Stop reason: HOSPADM

## 2024-06-21 RX ORDER — PIPERACILLIN SODIUM, TAZOBACTAM SODIUM 4; .5 G/20ML; G/20ML
4.5 INJECTION, POWDER, LYOPHILIZED, FOR SOLUTION INTRAVENOUS ONCE
Status: COMPLETED | OUTPATIENT
Start: 2024-06-21 | End: 2024-06-21

## 2024-06-21 RX ORDER — NALOXONE HYDROCHLORIDE 0.4 MG/ML
0.4 INJECTION, SOLUTION INTRAMUSCULAR; INTRAVENOUS; SUBCUTANEOUS
Status: DISCONTINUED | OUTPATIENT
Start: 2024-06-21 | End: 2024-06-22 | Stop reason: HOSPADM

## 2024-06-21 RX ORDER — ACETAMINOPHEN 325 MG/1
650 TABLET ORAL EVERY 4 HOURS PRN
Status: DISCONTINUED | OUTPATIENT
Start: 2024-06-21 | End: 2024-06-22 | Stop reason: HOSPADM

## 2024-06-21 RX ORDER — LISINOPRIL 20 MG/1
20 TABLET ORAL DAILY
Status: DISCONTINUED | OUTPATIENT
Start: 2024-06-22 | End: 2024-06-22 | Stop reason: HOSPADM

## 2024-06-21 RX ORDER — SODIUM CHLORIDE 9 MG/ML
INJECTION, SOLUTION INTRAVENOUS CONTINUOUS
Status: DISCONTINUED | OUTPATIENT
Start: 2024-06-21 | End: 2024-06-22 | Stop reason: HOSPADM

## 2024-06-21 RX ORDER — AMLODIPINE BESYLATE 5 MG/1
5 TABLET ORAL DAILY
Status: DISCONTINUED | OUTPATIENT
Start: 2024-06-22 | End: 2024-06-22 | Stop reason: HOSPADM

## 2024-06-21 RX ORDER — MULTIVIT WITH MINERALS/LUTEIN
1000 TABLET ORAL DAILY
COMMUNITY

## 2024-06-21 RX ADMIN — PIPERACILLIN AND TAZOBACTAM 4.5 G: 4; .5 INJECTION, POWDER, FOR SOLUTION INTRAVENOUS at 17:43

## 2024-06-21 RX ADMIN — IOPAMIDOL 116 ML: 755 INJECTION, SOLUTION INTRAVENOUS at 15:12

## 2024-06-21 RX ADMIN — SODIUM CHLORIDE 73 ML: 9 INJECTION, SOLUTION INTRAVENOUS at 15:12

## 2024-06-21 ASSESSMENT — ACTIVITIES OF DAILY LIVING (ADL)
ADLS_ACUITY_SCORE: 35

## 2024-06-21 ASSESSMENT — COLUMBIA-SUICIDE SEVERITY RATING SCALE - C-SSRS
6. HAVE YOU EVER DONE ANYTHING, STARTED TO DO ANYTHING, OR PREPARED TO DO ANYTHING TO END YOUR LIFE?: NO
1. IN THE PAST MONTH, HAVE YOU WISHED YOU WERE DEAD OR WISHED YOU COULD GO TO SLEEP AND NOT WAKE UP?: NO
2. HAVE YOU ACTUALLY HAD ANY THOUGHTS OF KILLING YOURSELF IN THE PAST MONTH?: NO

## 2024-06-21 NOTE — PROGRESS NOTES
"SUBJECTIVE  HPI: Chalo Huynh is a 64 year old male  who presents with the CC of abdominal/pelvic pain.   Pain is located in the suprapubic area, with radiation to None    The pain is characterized as \"pain\".    Pain has been present for 4 day(s) and is slowly progressive.     EXACERBATING FACTORS: NEGATIVE.   RELIEVING FACTORS: NEGATIVE.    ASSOCIATED SX: none.     Past Medical History:   Diagnosis Date    Abdominal pain, right upper quadrant     negative RUQ u/s    Allergic rhinitis, cause unspecified     Basal cell carcinoma  2014     LLE (shin area)    Blood glucose elevated 2017    Deviated nasal septum     CT sinuses neg for polyps    FATIGUE, POSSIBLE OBSTRUCTIVE SLEEP APNEA     Hyperlipidemia LDL goal <100 2017    Lumbago     MIGRAINE HEADACHES     Obesity, unspecified     Other and unspecified hyperlipidemia     Perforation of tympanic membrane, unspecified     Tobacco use disorder     Urethral stricture  2012     Allergies   Allergen Reactions    Bees      anaphylaxis     Social History     Tobacco Use    Smoking status: Former     Current packs/day: 0.00     Average packs/day: 1 pack/day for 12.0 years (12.0 ttl pk-yrs)     Types: Cigarettes     Start date: 1990     Quit date: 2002     Years since quittin.3    Smokeless tobacco: Never    Tobacco comments:     quit with an illness-hasn't had one since 07   Substance Use Topics    Alcohol use: No       ROS:CONSTITUTIONAL:NEGATIVE for fever, chills, change in weight    EXAMINATION:  BP (!) 145/82   Pulse 68   Temp 97.3  F (36.3  C)   Wt 107 kg (236 lb)   SpO2 97%   BMI 31.14 kg/m    GENERAL APPEARANCE: healthy, alert and no distress  ABDOMEN: tenderness moderate suprapubic    CT scan  IMPRESSION:   1.  Acute perforated sigmoid diverticulitis, with free intraperitoneal  air noted below the hemidiaphragms. No associated abscess.  2.  Mild diffuse bladder wall thickening could be related to lack " of  distention and/or prostatic enlargement, although cystitis can also  have this appearance.  3.  Pericardial calcification is likely related to prior pericarditis.      ICD-10-CM    1. Perforated diverticulum of intestine  K57.80       2. Lower abdominal pain  R10.30 CBC with Platelets & Differential     UA with Microscopic reflex to Culture     XR Abdomen 2 Views     UA Microscopic with Reflex to Culture     CT Abdomen Pelvis w Contrast           3. Leukocytosis, unspecified type  D72.829 CT Abdomen Pelvis w Contrast             Attempted direct admission with Dr. Suero who would like pt evaluated through ED.    ED informed of transfer of care and contacted pt directing him to ED.

## 2024-06-21 NOTE — PHARMACY-ADMISSION MEDICATION HISTORY
Pharmacist Admission Medication History    Admission medication history is complete. The information provided in this note is only as accurate as the sources available at the time of the update.    Information Source(s): Patient and CareEverywhere/SureScripts via in-person    Changes made to PTA medication list:  Added: vitamin C  Deleted: None  Changed: None    Allergies reviewed with patient and updates made in EHR: no    Medication History Completed By: Kareen Polanco [facility] 6/21/2024 5:57 PM    PTA Med List   Medication Sig Last Dose    amLODIPine-benazepril (LOTREL) 5-20 MG capsule TAKE 1 CAPSULE BY MOUTH DAILY 6/20/2024 at am    vitamin C (ASCORBIC ACID) 1000 MG TABS Take 1,000 mg by mouth daily 6/20/2024

## 2024-06-21 NOTE — ED NOTES
[facility]  ED Nurse Handoff Report    ED Chief complaint: Abdominal Pain      ED Diagnosis:   Final diagnoses:   None       Code Status: not addressed at this time    Allergies:   Allergies   Allergen Reactions    Bees      anaphylaxis       Patient Story: Patient had been having abd pain and diarrhea for the last week, that had been constant. Patient went to  today to get it checked out. CT was ordered that showed that patient had a perforated diverticula and was advised to come to the ED.     Focused Assessment:  Patient states pain is better now, tired that he hasn't been sleeping d/t the pain and diarrhea. Patient has been waking up at night with sweats, but no temperature.     Treatments and/or interventions provided: IV, labs, zosyn  Patient's response to treatments and/or interventions: No change    To be done/followed up on inpatient unit:  Continue to monitor     Does this patient have any cognitive concerns?:  A/Ox4    Activity level - Baseline/Home:  Independent  Activity Level - Current:   Independent    Patient's Preferred language: English   Needed?: No    Isolation: None  Infection: Not Applicable  Patient tested for COVID 19 prior to admission: NO  Bariatric?: No    Vital Signs:   Vitals:    06/21/24 1739 06/21/24 1745   BP: (!) 159/96 (!) 172/99   BP Location: Right arm    Pulse: 95 96   Resp: 16 21   Temp: 98.7  F (37.1  C)    TempSrc: Oral    SpO2: 94% 92%       Cardiac Rhythm:     Was the PSS-3 completed:   Yes  What interventions are required if any?               Family Comments: NA, patient states family knows he is here.   OBS brochure/video discussed/provided to patient/family: N/A              Name of person given brochure if not patient: NA              Relationship to patient: NA    For the majority of the shift this patient's behavior was Green.   Behavioral interventions performed were None.    ED NURSE PHONE NUMBER: [phone]          Detail Level: Zone

## 2024-06-21 NOTE — ED PROVIDER NOTES
Emergency Department Note      History of Present Illness     Chief Complaint  Abdominal Pain    Chalo Huynh is a 64 year old male with a history of hyperlipidemia  who presents to the ER for central lower abdominal pain. Patient reports mowing multiple lawns and having a drink afterwards when the pain started 4 days ago and was intensified with sitting down. He states that the pain got really bad 3 days ago and mostly went away by yesterday morning. He notes that he was in the bathroom for 2 hours 2 days ago where he passed dark brown stool. He endorses fever, sweating heavily, and chills. His pain is currently 1/10 and he has not eaten anything today.     Independent Historian  None    Review of External Notes  Yes-I reviewed the patient's visit to the urgent care earlier today and reviewed the CT scan that he had performed.  Past Medical History   Medical History and Problem List  Abdominal pain  Allergic rhinitis  Basal cell carcinoma  Blood glucose elevated  Deviated nasal septum  BREANA  Hyperlipidemia  Lumbago  MIGRAINE  Obesity, unspecified  Perforation of tympanic membrane, unspecified  Tobacco use disorder  Urethral stricture    Medications  Amlodipine     Surgical History   Colonoscopy   PDA repair   Physical Exam   No data found.  Physical Exam  Eye:  Pupils are equal, round, and reactive.  Extraocular movements intact.    ENT:  No rhinorrhea.  Moist mucus membranes.  Normal tongue and tonsil.    Cardiac:  Regular rate and rhythm.  No murmurs, gallops, or rubs.    Pulmonary:  Clear to auscultation bilaterally.  No wheezes, rales, or rhonchi.    Abdomen:  Positive bowel sounds.  Focal tenderness in the suprapubic area without rebound or guarding.    Musculoskeletal:  Normal movement of all extremities without evidence for deficit.    Skin:  Warm and dry without rashes.    Neurologic:  Non-focal exam without asymmetric weakness or numbness.     Psychiatric:  Normal affect with appropriate interaction  with examiner.    Diagnostics   Lab Results   Labs Ordered and Resulted from Time of ED Arrival to Time of ED Departure - No data to display    Imaging  No orders to display       Independent Interpretation  None  ED Course    Medications Administered  Medications   piperacillin-tazobactam (ZOSYN) 4.5 g vial to attach to  mL bag (has no administration in time range)       Discussion of Management  Admitting hospitalist, Dr. Washington  Surgery, Dr. Mckeon     Social Determinants of Health adding to complexity of care  None    ED Course  ED Course as of 06/21/24 2326 Fri Jun 21, 2024 1708 I obtained the history and examined the patient as noted above.    1849 I rechecked the patient and explained findings.     1849 I spoke with Dr. Mckeon, Surgery, regarding the patient's presentation, findings, and plan of care.      1903 I spoke with Dr. Washington, hospitalist, who accepts the patient.        Medical Decision Making / Diagnosis   CMS Diagnoses: None    MIPS     None    MDM  Chalo Huynh is a 64 year old male presenting to us with an abnormal CT scan.  He provides a classic history for diverticulitis, now with perforation seen on the CT with a few air bubbles seen under the diaphragm.  With this, I obtained labs which are reassuring.  I spoke with colorectal surgery and they do not feel the patient will require emergent surgery.  Antibiotics were initiated and he will be admitted to the hospitalist service.    Disposition  The patient was admitted to the hospital.     ICD-10 Codes:    ICD-10-CM    1. Diverticulitis of colon with perforation  K57.20            Discharge Medications  New Prescriptions    No medications on file         Scribe Disclosure:  I, Praneeth Duarte, am serving as a scribe at 5:52 PM on 6/21/2024 to document services personally performed by Trierweiler, Chad A, MD based on my observations and the provider's statements to me.        Trierweiler, Chad A, MD  06/21/24 0941

## 2024-06-22 VITALS
HEART RATE: 79 BPM | SYSTOLIC BLOOD PRESSURE: 159 MMHG | OXYGEN SATURATION: 94 % | RESPIRATION RATE: 16 BRPM | DIASTOLIC BLOOD PRESSURE: 96 MMHG | TEMPERATURE: 98.4 F

## 2024-06-22 LAB
ANION GAP SERPL CALCULATED.3IONS-SCNC: 14 MMOL/L (ref 7–15)
BASOPHILS # BLD AUTO: 0 10E3/UL (ref 0–0.2)
BASOPHILS NFR BLD AUTO: 0 %
BUN SERPL-MCNC: 12.6 MG/DL (ref 8–23)
CALCIUM SERPL-MCNC: 9.7 MG/DL (ref 8.8–10.2)
CHLORIDE SERPL-SCNC: 103 MMOL/L (ref 98–107)
CREAT SERPL-MCNC: 1 MG/DL (ref 0.67–1.17)
DEPRECATED HCO3 PLAS-SCNC: 21 MMOL/L (ref 22–29)
EGFRCR SERPLBLD CKD-EPI 2021: 84 ML/MIN/1.73M2
EOSINOPHIL # BLD AUTO: 0 10E3/UL (ref 0–0.7)
EOSINOPHIL NFR BLD AUTO: 0 %
ERYTHROCYTE [DISTWIDTH] IN BLOOD BY AUTOMATED COUNT: 12.9 % (ref 10–15)
GLUCOSE SERPL-MCNC: 135 MG/DL (ref 70–99)
HCT VFR BLD AUTO: 46.3 % (ref 40–53)
HGB BLD-MCNC: 14.9 G/DL (ref 13.3–17.7)
IMM GRANULOCYTES # BLD: 0 10E3/UL
IMM GRANULOCYTES NFR BLD: 0 %
LYMPHOCYTES # BLD AUTO: 1.6 10E3/UL (ref 0.8–5.3)
LYMPHOCYTES NFR BLD AUTO: 14 %
MCH RBC QN AUTO: 29.7 PG (ref 26.5–33)
MCHC RBC AUTO-ENTMCNC: 32.2 G/DL (ref 31.5–36.5)
MCV RBC AUTO: 92 FL (ref 78–100)
MONOCYTES # BLD AUTO: 0.9 10E3/UL (ref 0–1.3)
MONOCYTES NFR BLD AUTO: 7 %
NEUTROPHILS # BLD AUTO: 9.1 10E3/UL (ref 1.6–8.3)
NEUTROPHILS NFR BLD AUTO: 78 %
NRBC # BLD AUTO: 0 10E3/UL
NRBC BLD AUTO-RTO: 0 /100
PLATELET # BLD AUTO: 238 10E3/UL (ref 150–450)
POTASSIUM SERPL-SCNC: 4.5 MMOL/L (ref 3.4–5.3)
RBC # BLD AUTO: 5.02 10E6/UL (ref 4.4–5.9)
SODIUM SERPL-SCNC: 138 MMOL/L (ref 135–145)
WBC # BLD AUTO: 11.7 10E3/UL (ref 4–11)

## 2024-06-22 PROCEDURE — 36415 COLL VENOUS BLD VENIPUNCTURE: CPT | Performed by: HOSPITALIST

## 2024-06-22 PROCEDURE — 96376 TX/PRO/DX INJ SAME DRUG ADON: CPT

## 2024-06-22 PROCEDURE — 80048 BASIC METABOLIC PNL TOTAL CA: CPT | Performed by: HOSPITALIST

## 2024-06-22 PROCEDURE — 250N000011 HC RX IP 250 OP 636: Mod: JZ | Performed by: HOSPITALIST

## 2024-06-22 PROCEDURE — 250N000013 HC RX MED GY IP 250 OP 250 PS 637: Performed by: HOSPITALIST

## 2024-06-22 PROCEDURE — G0378 HOSPITAL OBSERVATION PER HR: HCPCS

## 2024-06-22 PROCEDURE — 99239 HOSP IP/OBS DSCHRG MGMT >30: CPT | Performed by: STUDENT IN AN ORGANIZED HEALTH CARE EDUCATION/TRAINING PROGRAM

## 2024-06-22 PROCEDURE — 99232 SBSQ HOSP IP/OBS MODERATE 35: CPT | Performed by: STUDENT IN AN ORGANIZED HEALTH CARE EDUCATION/TRAINING PROGRAM

## 2024-06-22 PROCEDURE — 85025 COMPLETE CBC W/AUTO DIFF WBC: CPT | Performed by: HOSPITALIST

## 2024-06-22 PROCEDURE — 258N000003 HC RX IP 258 OP 636: Performed by: HOSPITALIST

## 2024-06-22 RX ORDER — TRAZODONE HYDROCHLORIDE 50 MG/1
50 TABLET, FILM COATED ORAL
Status: DISCONTINUED | OUTPATIENT
Start: 2024-06-22 | End: 2024-06-22 | Stop reason: HOSPADM

## 2024-06-22 RX ORDER — LANOLIN ALCOHOL/MO/W.PET/CERES
3 CREAM (GRAM) TOPICAL AT BEDTIME
Status: DISCONTINUED | OUTPATIENT
Start: 2024-06-22 | End: 2024-06-22 | Stop reason: HOSPADM

## 2024-06-22 RX ADMIN — SODIUM CHLORIDE: 9 INJECTION, SOLUTION INTRAVENOUS at 10:43

## 2024-06-22 RX ADMIN — PIPERACILLIN AND TAZOBACTAM 3.38 G: 3; .375 INJECTION, POWDER, FOR SOLUTION INTRAVENOUS at 10:43

## 2024-06-22 RX ADMIN — PIPERACILLIN AND TAZOBACTAM 3.38 G: 3; .375 INJECTION, POWDER, FOR SOLUTION INTRAVENOUS at 00:18

## 2024-06-22 RX ADMIN — LISINOPRIL 20 MG: 20 TABLET ORAL at 08:40

## 2024-06-22 RX ADMIN — PIPERACILLIN AND TAZOBACTAM 3.38 G: 3; .375 INJECTION, POWDER, FOR SOLUTION INTRAVENOUS at 05:54

## 2024-06-22 RX ADMIN — SODIUM CHLORIDE: 9 INJECTION, SOLUTION INTRAVENOUS at 00:17

## 2024-06-22 RX ADMIN — AMLODIPINE BESYLATE 5 MG: 5 TABLET ORAL at 08:40

## 2024-06-22 RX ADMIN — PIPERACILLIN AND TAZOBACTAM 3.38 G: 3; .375 INJECTION, POWDER, FOR SOLUTION INTRAVENOUS at 17:12

## 2024-06-22 ASSESSMENT — ACTIVITIES OF DAILY LIVING (ADL)
ADLS_ACUITY_SCORE: 18
ADLS_ACUITY_SCORE: 36
ADLS_ACUITY_SCORE: 19
ADLS_ACUITY_SCORE: 18

## 2024-06-22 NOTE — CONSULTS
Colon and Rectal Surgery Consultation         Chalo Huynh    MRN# [id number]   YOB: 1960 Age: 64 year old   Date of Admission: 6/21/2024  Date of Consult: 6/21/2024          Assessment and Plan:      This is a 64-year-old man with a past medical history history of hypertension who is presenting with his first episode of acute perforated diverticulitis.  I personally reviewed the imaging and there is some inflammation around the sigmoid colon as well as some free air next to it.  There are also some specks of air under the diaphragm.  Patient's exam is completely benign with a white blood cell count of 14.  Vitals are normal.  At this time we will plan for nonoperative management of his perforated diverticulitis.  We discussed the pathophysiology of diverticulitis as well as the anticipated clinical course.  Patient will be admitted, made n.p.o., start IV antibiotics, and IV fluids.  If tomorrow he is continued to do well, we will plan to advance his diet with the goal to eventually transition to a low fiber diet and oral antibiotics regimen.  He had a colonoscopy in 2022 where he was found to have one 3 mm polyp in the cecum and two 3 mm polyps in the transverse colon. There was no note of diverticulosis at the time. So he will not need a repeat colonoscopy after his diverticulitis has healed. Given that this is his first episode, he may be able to forego surgery as well. Patient was able to ask excellent questions and he is in agreement with the plan.    Summary of the plan:  - Admission to medical service  - IV antibiotics  - IV fluids  - N.p.o. with ice chips okay  - All other cares per primary team    Discussed with Dr. Papa Dinh MD on 6/21/2024 at 7:05 PM            Primary Care Physician:      Hamilton Burnett [phone]         Requesting Physician:      Trierweiler, MD          Chief Complaint:      Abdominal pain  History is obtained from the patient.         History of Present  Illness:      Chalo Huynh is a 64 year old man with past medical history of open heart surgery as an infant, hypertension, obesity, hyperlipidemia, who presents with concerns for abdominal pain for the past 4 days.  He says that the pain started on Monday and that became progressively worse over the course of the following day.  At that time he also had some fevers and chills.  He took some Tylenol and then his pain started to subside starting Wednesday.  Thursday he felt okay.  Given the fact that he continued to have some fevers and chills, he was advised by his friends to present to the emergency department for evaluation.  He presented to a urgent care clinic where a CT scan was done and that showed acute perforated diverticulitis with some specks of free air under the diaphragm.  He was advised to present to the emergency department.  In the emergency department he was found to have normal vitals, leukocytosis to 14.0.  Given these findings, colorectal surgery was consulted for evaluation.  He currently denies any pain, and feels well.  He does have some chills. He has never had anything like this before.  His last colonoscopy was in 2022.               Past Medical History:        Past Medical History:   Diagnosis Date    Abdominal pain, right upper quadrant 11/02    negative RUQ u/s    Allergic rhinitis, cause unspecified     Basal cell carcinoma  Nov 2014     LLE (shin area)    Blood glucose elevated 2/25/2017    Deviated nasal septum 2002    CT sinuses neg for polyps    FATIGUE, POSSIBLE OBSTRUCTIVE SLEEP APNEA     Hyperlipidemia LDL goal <100 2/25/2017    Lumbago 6/02    MIGRAINE HEADACHES     Obesity, unspecified     Other and unspecified hyperlipidemia     Perforation of tympanic membrane, unspecified     Tobacco use disorder     Urethral stricture  April 2012             Past Surgical History:        Past Surgical History:   Procedure Laterality Date    COLONOSCOPY N/A 8/5/2022    Procedure:  COLONOSCOPY, WITH POLYPECTOMY AND BIOPSY;  Surgeon: Kd Leger MD;  Location: [facility] NONSPECIFIC PROCEDURE  1967    PDA repair                 Home Medications:        Prior to Admission medications    Medication Sig Last Dose Taking? Auth Provider Long Term End Date   amLODIPine-benazepril (LOTREL) 5-20 MG capsule TAKE 1 CAPSULE BY MOUTH DAILY 2024 at am Yes Hamilton Burnett MD Yes    vitamin C (ASCORBIC ACID) 1000 MG TABS Take 1,000 mg by mouth daily 2024 Yes Unknown, Entered By History     EPINEPHrine (ANY BX GENERIC EQUIV) 0.3 MG/0.3ML injection 2-pack Inject 0.3 mLs (0.3 mg) into the muscle as needed for anaphylaxis  Patient not taking: Reported on 2024   Hamilton Burnett MD              Current Medications:         No current facility-administered medications for this encounter.             Allergies:     Allergies   Allergen Reactions    Bees      anaphylaxis            Social History:        Social History     Tobacco Use    Smoking status: Former     Current packs/day: 0.00     Average packs/day: 1 pack/day for 12.0 years (12.0 ttl pk-yrs)     Types: Cigarettes     Start date: 1990     Quit date: 2002     Years since quittin.3    Smokeless tobacco: Never    Tobacco comments:     quit with an illness-hasn't had one since 07   Substance Use Topics    Alcohol use: No             Family History:        Family History   Problem Relation Age of Onset    Cancer Father         lung CA             Review of Systems:        The 10 point Review of Systems is negative other than noted in the HPI.            Physical Exam:      Blood pressure (!) 162/95, pulse 98, temperature 98.7  F (37.1  C), temperature source Oral, resp. rate 16, SpO2 92%.  There were no vitals filed for this visit.  Vital Sign Ranges  Temperature Temp  Av  F (36.7  C)  Min: 97.3  F (36.3  C)  Max: 98.7  F (37.1  C)   Blood pressure Systolic (24hrs), Av , Min:145 , Max:172        Diastolic  (24hrs), Av, Min:82, Max:99      Pulse Pulse  Av  Min: 68  Max: 103   Respirations Resp  Av.2  Min: 16  Max: 21   Pulse oximetry SpO2  Av.2 %  Min: 92 %  Max: 97 %       No intake or output data in the 24 hours ending 24 1902  General: No acute distress  Cardiovascular: Not tachycardic  Pulmonary: Breathing unlabored  Abdomen: Soft, tender to palpation in the mid abdomen, nondistended.  No peritonitis, rigidity, rebound.       Data:      All new lab and imaging data was reviewed.   Recent Labs   Lab Test 24  1722 24  1027 23  0858   WBC 14.2* 11.5* 7.0   HGB 15.3 15.3 15.8    225 243   INR  --   --  1.09      Recent Labs   Lab Test 24  1722 23  0858 22  0918 22  0828    140  --  138   POTASSIUM 4.0 4.6  --  4.2   CHLORIDE 100 105  --  108   CO2 21* 22  --  25   BUN 13.1 23.1*  --  14   CR 1.12 1.03  --  0.93   ANIONGAP 14 13  --  5   MIGUEL 9.3 9.8  --  9.4   * 115* 127* 123*     Imaging:  IMPRESSION:   1.  Acute perforated sigmoid diverticulitis, with some free  intraperitoneal air noted below the hemidiaphragms. No associated  abscess.  2.  Mild diffuse bladder wall thickening could be related to lack of  distention and/or prostatic enlargement, although cystitis can also  have this appearance.  3.  Pericardial calcification is likely related to prior pericarditis.     Impression:        - Preparation of the colon was fair.                             - The examined portion of the ileum was normal.                             - One 3 mm polyp in the cecum, removed with a jumbo                             cold forceps. Resected and retrieved.                             - Two 3 to 5 mm polyps in the transverse colon,                             removed with a jumbo cold forceps. Resected and                             retrieved.                             - Internal hemorrhoids. Colonoscopy:   Path  A.  Colon, cecum:  Polypectomy:  - Non-dysplastic colonic mucosa      B.  Colon, transverse: Polypectomy:  - Tubular adenoma  - Hyperplastic polyp   - No evidence of high-grade dysplasia or invasive malignancy  h:       Wade Dinh MD  [facility]

## 2024-06-22 NOTE — DISCHARGE SUMMARY
"[facility]  Hospitalist Discharge Summary      Date of Admission:  6/21/2024  Date of Discharge:  6/22/2024  Discharging Provider: Jas Acosta MD  Discharge Service: Hospitalist Service    Discharge Diagnoses   Acute perforated diverticulitis  Hypertension    Clinically Significant Risk Factors          Follow-ups Needed After Discharge   Follow-up Appointments     Follow-up and recommended labs and tests       Follow up with primary care provider, Hamilton Burnett, within 7-14 days for   hospital follow- up.  No follow up labs or test are needed.            Unresulted Labs Ordered in the Past 30 Days of this Admission       Date and Time Order Name Status Description    6/21/2024  7:29 PM Blood Culture Peripheral Blood Preliminary     6/21/2024  7:29 PM Blood Culture Peripheral Blood Preliminary         These results will be followed up by hospitalist discharge pool. Patient discharged with 10 days of augmentin    Discharge Disposition   Discharged to home  Condition at discharge: Stable    Hospital Course   Chalo Huynh is a 64 year old male with PMH significant for hypertension, deviated nasal septum who was sent to ED from urgent care for further evaluation and management of perforated diverticulitis, admitted inpatient 6/21/24.     He has been having abdominal pain along with some diarrhea for past 4 to 5 days, started after taking \"Russian Tea Cakes with nuts\".  He was seen by colorectal surgery who felt there was no need for operative intervention or follow up colonoscopy. His diet was advanced to low fiber which was tolerated well. He discharged home with 10 day course of augmentin.      Acute perforated diverticulitis  -has been having some chills and rigors at home; afebrile here  -leukocytosis with WBC 14.2; normal lactic acid  -UA unremarkable  -CT abdomen noted acute perforated sigmoid diverticulitis with some free intraperitoneal air, no associated abscess; also noted " mild diffuse bladder wall thickening likely due to lack of distention and/or prostate enlargement or probable cystitis     -Low fiber diet  -Discharge on augmentin for 10 day course  -follow blood cultures     Hypertension  - continue PTA amlodipine-benazepril    Consultations This Hospital Stay   COLORECTAL SURGERY IP CONSULT    Code Status   Full Code    Time Spent on this Encounter   I, Jas Acosta MD, personally saw the patient today and spent greater than 30 minutes discharging this patient.       Jas Acosta MD  [facility]  [address] [place] [id number]  Phone: [phone]  Fax: [phone]  ______________________________________________________________________    Physical Exam   Vital Signs: Temp: 98.4  F (36.9  C) Temp src: Oral BP: (!) 159/96 Pulse: 79   Resp: 16 SpO2: 94 % O2 Device: None (Room air)    Weight: 0 lbs 0 oz  Constitutional: Awake, alert, cooperative, no apparent distress  Respiratory: Clear to auscultation bilaterally, no crackles or wheezing  Cardiovascular: Regular rate and rhythm, normal S1 and S2, and no murmur noted  GI: Normal bowel sounds, soft, non-distended, minimal low abd tenderness  Skin/Integumen: No rashes, no cyanosis, no edema  Other:          Primary Care Physician   Hamilton Burnett    Discharge Orders      Reason for your hospital stay    Diverticulitis     Follow-up and recommended labs and tests     Follow up with primary care provider, Hamilton Burnett, within 7-14 days for hospital follow- up.  No follow up labs or test are needed.     Activity    Your activity upon discharge: activity as tolerated     Diet    Follow this diet upon discharge:       Low Fiber Diet       Significant Results and Procedures   Most Recent 3 CBC's:  Recent Labs   Lab Test 06/22/24  0807 06/21/24  1722 06/21/24  1027   WBC 11.7* 14.2* 11.5*   HGB 14.9 15.3 15.3   MCV 92 92 94    234 225     Most Recent 3 BMP's:  Recent Labs   Lab Test  06/22/24  0807 06/21/24  1722 03/20/23  0858    135 140   POTASSIUM 4.5 4.0 4.6   CHLORIDE 103 100 105   CO2 21* 21* 22   BUN 12.6 13.1 23.1*   CR 1.00 1.12 1.03   ANIONGAP 14 14 13   MIGUEL 9.7 9.3 9.8   * 115* 115*   ,   Results for orders placed or performed in visit on 06/21/24   XR Abdomen 2 Views    Narrative    ABDOMEN TWO VIEWS   6/21/2024 10:47 AM     HISTORY: Lower abdominal pain.    COMPARISON: None.      Impression    IMPRESSION: Nonobstructive bowel. No free air. Clear lower lungs.       WILSON HOLDER MD         SYSTEM ID:  [id number]   CT Abdomen Pelvis w Contrast     Value    Radiologist flags Acute perforated sigmoid diverticulitis with free (AA)    Narrative    CT ABDOMEN AND PELVIS WITH CONTRAST 6/21/2024 3:26 PM    CLINICAL HISTORY: Lower abdominal pain. Leukocytosis.    TECHNIQUE: CT scan of the abdomen and pelvis was performed following  injection of IV contrast. Multiplanar reformats were obtained. Dose  reduction techniques were used.  CONTRAST: 116mL Isovue-370  COMPARISON: None.    FINDINGS:   LOWER CHEST: Pericardial calcification is noted anteriorly and  inferiorly.    HEPATOBILIARY: No hepatic masses. No calcified gallstones.    PANCREAS: Normal.    SPLEEN: Normal.    ADRENAL GLANDS: Normal.    KIDNEYS/BLADDER: Mild diffuse bladder wall thickening may be related  to lack of distention and/or prostatic enlargement, although cystitis  could also have this appearance. There is a 0.6 cm nonobstructing  calcification within the bladder wall anteriorly and superiorly  (series 3 image 186). The kidneys are unremarkable. No hydronephrosis.    BOWEL: Scattered sigmoid diverticulosis. Mild bowel wall thickening  with surrounding inflammatory stranding in the sigmoid colon,  consistent with acute diverticulitis. Extraluminal air bubbles are  consistent with perforation. There are also scattered bubbles of free  intraperitoneal air below the hemidiaphragms. No associated fluid  collection to  suggest abscess. No bowel obstruction. Unremarkable  appendix.    LYMPH NODES: No lymphadenopathy.    VASCULATURE: Mild atherosclerotic aortoiliac calcification.    PELVIC ORGANS: Mild prostatic enlargement.    ADDITIONAL FINDINGS: None.    MUSCULOSKELETAL: Degenerative changes in the visualized thoracolumbar  spine.      Impression    IMPRESSION:   1.  Acute perforated sigmoid diverticulitis, with some free  intraperitoneal air noted below the hemidiaphragms. No associated  abscess.  2.  Mild diffuse bladder wall thickening could be related to lack of  distention and/or prostatic enlargement, although cystitis can also  have this appearance.  3.  Pericardial calcification is likely related to prior pericarditis.    [Critical Result: Acute perforated sigmoid diverticulitis with free  intraperitoneal air]    Finding was identified on 6/21/2024 3:38 PM.     Dr. Fitzgerald was contacted by me on 6/21/2024 3:38 PM and verbalized  understanding of the critical result.     BHARAT MOTLEY MD         SYSTEM ID:  [id number]       Discharge Medications   Current Discharge Medication List        START taking these medications    Details   amoxicillin-clavulanate (AUGMENTIN) 875-125 MG tablet Take 1 tablet by mouth 2 times daily  Qty: 20 tablet, Refills: 0    Associated Diagnoses: Diverticulitis of colon with perforation           CONTINUE these medications which have NOT CHANGED    Details   amLODIPine-benazepril (LOTREL) 5-20 MG capsule TAKE 1 CAPSULE BY MOUTH DAILY  Qty: 90 capsule, Refills: 1    Associated Diagnoses: Hypertension, unspecified type      vitamin C (ASCORBIC ACID) 1000 MG TABS Take 1,000 mg by mouth daily      EPINEPHrine (ANY BX GENERIC EQUIV) 0.3 MG/0.3ML injection 2-pack Inject 0.3 mLs (0.3 mg) into the muscle as needed for anaphylaxis  Qty: 2 each, Refills: 3    Associated Diagnoses: Bee sting reaction, undetermined intent, initial encounter           Allergies   Allergies   Allergen Reactions    Bees       anaphylaxis

## 2024-06-22 NOTE — CARE PLAN
.RECEIVING UNIT ED HANDOFF REVIEW    ED Nurse Handoff Report was reviewed by: Luda Calzada RN on June 21, 2024 at 10:19 PM

## 2024-06-22 NOTE — CARE PLAN
Admission    Patient arrives to room Home via cart from ED  Care plan note: Patient arrived from the ED A&O independent in the room    Inpatient nursing criteria listed below were met:    Did you put disposition on whiteboard and in sticky note: NA  Full skin assessment done (add LDA if skin issue present). Initials of 2nd RN :REED  Isolation education started/completed NA  Patient allergies verified with patient: Yes  Fall Risk? (Care plan updated, Education given and documented) Yes  Primary Care Plan initiated: Yes  Home medications documented in belongings flowsheet: NA  Patient belongings documented in belongings flowsheet: Yes  Reminder note (belongings/ medications) placed in discharge instructions:NA  Admission profile/ required documentation complete: No  If patient is a 72 hour hold/Commitment are belongings removed from room and locked up? REED

## 2024-06-22 NOTE — PLAN OF CARE
Goal Outcome Evaluation:       Pt A&Ox4. VSS on RA. Up IND in room, voiding in BR. Tolerating Full liquid diet, plan to order Low fiber meal this evening and if tolerating can possibly discharge this evening. Denies pain, CMS intact. L PIV infusing NS@100cc/hr w/ int abx. Colorectal following. Possible discharge this evening. Continue plan of care.

## 2024-06-22 NOTE — H&P
"[facility]  History and Physical   Hospitalist  Marc Washington MD       Chalo Huynh MRN# [id number]   YOB: 1960 Age: 64 year old      Date of Admission:  6/21/2024         Assessment and Plan:   Chalo Huynh is a 64 year old male with PMH significant for hypertension, deviated nasal septum who was sent to ED from urgent care for further evaluation and management of perforated diverticulitis, admitted inpatient 6/21/24.    He has been having abdominal pain along with some diarrhea for past 4 to 5 days, started after taking \"Russian Tea Cakes with nuts\".     Acute perforated diverticulitis  -has been having some chills and rigors at home; afebrile here  -leukocytosis with WBC 14.2; normal lactic acid  -UA unremarkable  -CT abdomen noted acute perforated sigmoid diverticulitis with some free intraperitoneal air, no associated abscess; also noted mild diffuse bladder wall thickening likely due to lack of distention and/or prostate enlargement or probable cystitis    -ED provider discussed with colorectal surgery who suggested no acute surgical intervention at this time  -will keep NPO  -continue empiric Zosyn initiated in ER  -will get blood cultures  -IVF with NS at 100 ML per hour  -PRN pain medication  -will request formal colorectal surgery consultation  -will monitor CBC and BMP    Hypertension  -BP elevated likely secondary to pain  -will resume PTA amlodipine-benazepril  -hydralazine IV PRN for SBP> 180    Clinically Significant Risk Factors Present on Admission                    # Hypertension: Home medication list includes antihypertensive(s)                                 DVT prophylaxis: mechanical with patient reports  Code status: full code    Care plan discussed with ED provider and patient.           Primary Care Physician:   Hamilton Burnett [phone]         Chief Complaint:     Pain abdomen    History is obtained from the patient         History of " "Present Illness:     Chalo Huynh is a 64 year old male with PMH significant for hypertension, deviated nasal septum who was sent to ED from urgent care for further evaluation and management of perforated diverticulitis, admitted inpatient 6/21/24.    He has been having abdominal pain along with some diarrhea for past 4 to 5 days, started after taking \"Russian Tea Cakes with nuts\". His symptoms got better over next couple of days but then started having intermittent symptoms and worsening over past two days. He has been having some chills and riders as well with loose stools.     He went to urgent care. A CT abdomen was done which was noted with acute perforated sigmoid diverticulitis with some free intraperitoneal air, no associated abscess; also noted mild diffuse bladder wall thickening likely due to lack of distention and/or prostate enlargement or probable cystitis.    He was sent to ED for further evaluation. He was seen by Dr. Trierweiler who discussed with colorectal surgery who suggested no acute surgical intervention at this time. He was started on Zosyn and hospitalist was requested admission for further evaluation.    The patient denies any chest pain or shortness of breath.  No bladder disturbances.           Past Medical History:     Hypertension  deviated nasal septum          Past Surgical History:     Past Surgical History:   Procedure Laterality Date    COLONOSCOPY N/A 8/5/2022    Procedure: COLONOSCOPY, WITH POLYPECTOMY AND BIOPSY;  Surgeon: Kd Leger MD;  Location: [facility] NONSPECIFIC PROCEDURE  01/01/1967    PDA repair              Home Medications:     Prior to Admission Medications   Prescriptions Last Dose Informant Patient Reported? Taking?   EPINEPHrine (ANY BX GENERIC EQUIV) 0.3 MG/0.3ML injection 2-pack   No No   Sig: Inject 0.3 mLs (0.3 mg) into the muscle as needed for anaphylaxis   Patient not taking: Reported on 6/21/2024   amLODIPine-benazepril (LOTREL) 5-20 MG " capsule 6/20/2024 at am  No Yes   Sig: TAKE 1 CAPSULE BY MOUTH DAILY   vitamin C (ASCORBIC ACID) 1000 MG TABS 6/20/2024  Yes Yes   Sig: Take 1,000 mg by mouth daily      Facility-Administered Medications: None            Allergies:     Allergies   Allergen Reactions    Bees      anaphylaxis            Social History:   Chalo Huynh  reports that he quit smoking about 22 years ago. His smoking use included cigarettes. He started smoking about 34 years ago. He has a 12 pack-year smoking history. He has never used smokeless tobacco. He reports that he does not drink alcohol and does not use drugs.              Family History:   Chalo Huynh family history includes Cancer in his father.    Family history was reviewed by myself and not pertinent to current presentation.           Review of Systems:   A10 point Review of Systems was done and were negative other than noted in the HPI.             Physical Exam:   Blood pressure (!) 162/95, pulse 98, temperature 98.7  F (37.1  C), temperature source Oral, resp. rate 16, SpO2 92%.  0 lbs 0 oz        Constitutional: Alert, awake and oriented X 3; lying comfortably in bed in no apparent distress   HEENT: Pupils equal and reactive to light and accomodation, EOMI intact; neck supple no raised JVD or rigidity    Oral cavity: Moist mucosa   Cardiovascular: Normal s1 s2, regular rate and rhythm, no murmur   Lungs: B/l clear to auscultation, no wheezes or crepitations   Abdomen: Soft, moderate tenderness in the lower abdomen, nd, no guarding, rigidity or rebound; BS +   LE : No edema   Musculoskeletal: Power 5/5 in all extremities   Neuro: No focal neurological deficits noted, CN II to XII grossly intact   Psychiatry: normal mood and affect  Skin: No obvious skin rashes or ulcers             Data:   All new lab and imaging data was reviewed in Epic.   Significant labs and imagings include:    CMP mostly unremarkable with normal lactic acid  CBC with WBC 14.2  UA  unremarkable  CT abdomen:  IMPRESSION:   1.  Acute perforated sigmoid diverticulitis, with some free  intraperitoneal air noted below the hemidiaphragms. No associated  abscess.  2.  Mild diffuse bladder wall thickening could be related to lack of  distention and/or prostatic enlargement, although cystitis can also  have this appearance.  3.  Pericardial calcification is likely related to prior pericarditis             Marc Washington MD  Hospitalist

## 2024-06-22 NOTE — PLAN OF CARE
Discharge instructions reviewed w/ patient.  Discharge med given to patient.  No further questions at this time.  Tolerated low fiber diet.  Independent in room.  Denies pain.  Belongings packed.  Discharging to home, transport by family.

## 2024-06-22 NOTE — PROGRESS NOTES
"[facility]    Medicine Progress Note - Hospitalist Service    Date of Admission:  6/21/2024    Assessment & Plan   Chalo Huynh is a 64 year old male with PMH significant for hypertension, deviated nasal septum who was sent to ED from urgent care for further evaluation and management of perforated diverticulitis, admitted inpatient 6/21/24.     He has been having abdominal pain along with some diarrhea for past 4 to 5 days, started after taking \"Russian Tea Cakes with nuts\".      Acute perforated diverticulitis  -has been having some chills and rigors at home; afebrile here  -leukocytosis with WBC 14.2; normal lactic acid  -UA unremarkable  -CT abdomen noted acute perforated sigmoid diverticulitis with some free intraperitoneal air, no associated abscess; also noted mild diffuse bladder wall thickening likely due to lack of distention and/or prostate enlargement or probable cystitis     -Low fiber diet  -continue empiric Zosyn initiated in ER. Discharge on augmentin for 10 day course  -follow blood cultures  -IVF with NS at 100 ML per hour  -PRN pain medication     Hypertension  -BP elevated likely secondary to pain  -will resume PTA amlodipine-benazepril  -hydralazine IV PRN for SBP> 180          Diet: Low Fiber Diet  Diet    DVT Prophylaxis: Low Risk/Ambulatory with no VTE prophylaxis indicated and Pneumatic Compression Devices  Aldana Catheter: Not present  Lines: None     Cardiac Monitoring: None  Code Status: Full Code      Clinically Significant Risk Factors Present on Admission                  # Hypertension: Home medication list includes antihypertensive(s)                        Disposition Plan     Medically Ready for Discharge: Anticipated Tomorrow             Jas Acosta MD  Hospitalist Service  [facility]  Securely message with threadsy (more info)  Text page via [employer] Paging/Directory "   ______________________________________________________________________    Interval History   Patient feeling better today when seen. Low abd pain improved. No f/c/r. Up independent around the unit. Advancing diet well. Possible discharge today vs tomorrow pending tolerance of diet.     Physical Exam   Vital Signs: Temp: 98.4  F (36.9  C) Temp src: Oral BP: (!) 155/91 Pulse: 79   Resp: 16 SpO2: 94 % O2 Device: None (Room air)    Weight: 0 lbs 0 oz    Constitutional: Awake, alert, cooperative, no apparent distress  Respiratory: Clear to auscultation bilaterally, no crackles or wheezing  Cardiovascular: Regular rate and rhythm, normal S1 and S2, and no murmur noted  GI: Normal bowel sounds, soft, non-distended, minimal low abd pain.  Skin/Integumen: No rashes, no cyanosis, no edema  Other:       Medical Decision Making       45 MINUTES SPENT BY ME on the date of service doing chart review, history, exam, documentation & further activities per the note.      Data   ------------------------- PAST 24 HR DATA REVIEWED -----------------------------------------------    I have personally reviewed the following data over the past 24 hrs:    11.7 (H)  \   14.9   / 238     138 103 12.6 /  135 (H)   4.5 21 (L) 1.00 \     ALT: 24 AST: 20 AP: 83 TBILI: 1.1   ALB: 4.5 TOT PROTEIN: 8.3 LIPASE: N/A     Procal: N/A CRP: N/A Lactic Acid: 1.0         Imaging results reviewed over the past 24 hrs:   No results found for this or any previous visit (from the past 24 hour(s)).

## 2024-06-22 NOTE — PROVIDER NOTIFICATION
Per Hospitalist , if patient tolerates Low fiber diet/solid food, page provider and patient can likely discharge this evening.

## 2024-06-22 NOTE — PLAN OF CARE
Goal Outcome Evaluation:    Orientation: A/O x4  Activity: 1 Gb/walker   Diet: NPO  Tele: NA  IV Access/Drains: PIV infusing  Pain Management: Denied  Bowel/Bladder: Continent B/B  Consult: Colorectal  D/C Disposition: TBD

## 2024-06-22 NOTE — PROGRESS NOTES
COLON & RECTAL SURGERY  PROGRESS NOTE    June 22, 2024    SUBJECTIVE: Pain significantly improved.      OBJECTIVE:  Temp:  [97.3  F (36.3  C)-99.3  F (37.4  C)] 98.4  F (36.9  C)  Pulse:  [] 79  Resp:  [16-22] 16  BP: (141-172)/(82-99) 155/91  SpO2:  [92 %-97 %] 94 %  No intake or output data in the 24 hours ending 06/22/24 0913    GENERAL:  Awake, alert, no acute distress  EXTREMITIES: Warm and well perfused, no edema   ABDOMEN:  Soft, minimally tender in the suprapubic area, non-distended. No guarding, rigidity, or peritoneal signs.    LABS:  Lab Results   Component Value Date    WBC 11.7 06/22/2024    WBC 8.0 12/14/2012     Lab Results   Component Value Date    HGB 14.9 06/22/2024    HGB 14.1 12/14/2012     Lab Results   Component Value Date    HCT 46.3 06/22/2024    HCT 42.7 12/14/2012     Lab Results   Component Value Date     06/22/2024     12/14/2012     Last Basic Metabolic Panel:  Lab Results   Component Value Date     06/21/2024     05/24/2018      Lab Results   Component Value Date    POTASSIUM 4.0 06/21/2024    POTASSIUM 4.2 07/18/2022    POTASSIUM 4.3 05/24/2018     Lab Results   Component Value Date    CHLORIDE 100 06/21/2024    CHLORIDE 108 07/18/2022    CHLORIDE 109 05/24/2018     Lab Results   Component Value Date    MIGUEL 9.3 06/21/2024    MIGUEL 9.3 05/24/2018     Lab Results   Component Value Date    CO2 21 06/21/2024    CO2 25 07/18/2022    CO2 24 05/24/2018     Lab Results   Component Value Date    BUN 13.1 06/21/2024    BUN 14 07/18/2022    BUN 19 05/24/2018     Lab Results   Component Value Date    CR 1.12 06/21/2024    CR 0.96 05/24/2018     Lab Results   Component Value Date     06/21/2024     07/18/2022     05/24/2018       ASSESSMENT/PLAN: Chalo Huynh is a 64 year old male admitted with his first episode of acute, perforated diverticulitis.  Last colonoscopy was in 2022 with 1 tubular adenoma.    No indication for acute surgical  intervention  Full liquid diet.  Can advance to a low fiber diet this afternoon.  Multimodal pain control   OOB, ambulate  If tolerates diet advancement, can transition to oral antibiotics.  Would recommend a total of 10-day course.  Tentative discharge later on this evening versus tomorrow pending clinical progress.  Given that he has had a recent colonoscopy, he does not require a follow-up colonoscopy on discharge.  As this is his first episode, there is no indication for elective surgical resection.  CRS will follow while inpatient.    Clinically Significant Risk Factors Present on Admission                  # Hypertension: Home medication list includes antihypertensive(s)        For questions/paging, please contact the CRS office at [phone].    Sarah Elam MD  Colorectal Surgery    [facility]  [address] [address]  T: [phone]  F: [phone]

## 2024-06-24 ENCOUNTER — PATIENT OUTREACH (OUTPATIENT)
Dept: INTERNAL MEDICINE | Facility: CLINIC | Age: 64
End: 2024-06-24
Payer: COMMERCIAL

## 2024-06-25 NOTE — TELEPHONE ENCOUNTER
ED / Discharge Outreach Protocol    Patient Contact    Attempt # 1    Was call answered?  No.  Left message on voicemail with information to call me back.    Upon call back: please complete hospital follow-up questions.     Thank you,  Delilah Ahuja RN

## 2024-06-26 NOTE — TELEPHONE ENCOUNTER
"Attempted to contact pt for hospital follow up. Pts wife answered. Pt is not home. Pts wife will have pt call us back.     On callback- please perform hospital follow up.       Patient Contact    Attempt # 2    Was call answered?  Yes.  \"May I please speak with <patient name>\"  Is patient available?   No. Left message with wife for patient to call me back.   "

## 2024-06-26 NOTE — TELEPHONE ENCOUNTER
"Pt returned call to clinic.       Transitions of Care Outreach  Chief Complaint   Patient presents with    Hospital F/U     Acute perforated diverticulitis       Most Recent Admission Date: 6/21/2024   Most Recent Admission Diagnosis: Diverticulitis of colon with perforation - K57.20     Most Recent Discharge Date: 6/22/2024   Most Recent Discharge Diagnosis: Diverticulitis of colon with perforation - K57.20     Transitions of Care Assessment    Discharge Assessment  How are you doing now that you are home?: \"not too bad\"  How are your symptoms? (Red Flag symptoms escalate to triage hotline per guidelines): Improved  Do you know how to contact your clinic care team if you have future questions or changes to your health status? : Yes  Does the patient have their discharge instructions? : No - Review discharge instructions  Does the patient have questions regarding their discharge instructions? : No  Were you started on any new medications or were there changes to any of your previous medications? : Yes    Follow up Plan          Future Appointments   Date Time Provider Department Center   7/24/2024  3:00 PM Hamilton Burnett MD [facility]       Outpatient Plan as outlined on AVS reviewed with patient.    For any urgent concerns, please contact our 24 hour nurse triage line: [phone] ([phone])       Milana Burden RN      "

## 2024-06-27 LAB
BACTERIA BLD CULT: NO GROWTH
BACTERIA BLD CULT: NO GROWTH

## 2024-06-28 ENCOUNTER — TELEPHONE (OUTPATIENT)
Dept: INTERNAL MEDICINE | Facility: CLINIC | Age: 64
End: 2024-06-28
Payer: COMMERCIAL

## 2024-06-28 NOTE — TELEPHONE ENCOUNTER
"Patient called reporting \"spine pain\" during and since 6-21 hospital admission. Pain described as waist level. He thinks it may be from not sleeping well but a friend suggested he as for an extension of antibiotic therapy currently being used for treatment of diverticulitis. Has 3 days left in course. Back pain comes and goes and is not worsening. Patient using tylenol as directed with good relief. Hospital follow up appointment moved up to Monday with PCP.     Jenni Kulkarni RN    "

## 2024-07-01 ENCOUNTER — OFFICE VISIT (OUTPATIENT)
Dept: INTERNAL MEDICINE | Facility: CLINIC | Age: 64
End: 2024-07-01
Payer: COMMERCIAL

## 2024-07-01 VITALS
OXYGEN SATURATION: 93 % | SYSTOLIC BLOOD PRESSURE: 129 MMHG | WEIGHT: 233.7 LBS | BODY MASS INDEX: 30.97 KG/M2 | DIASTOLIC BLOOD PRESSURE: 80 MMHG | TEMPERATURE: 98.7 F | HEART RATE: 72 BPM | HEIGHT: 73 IN

## 2024-07-01 DIAGNOSIS — K57.32 DIVERTICULITIS OF COLON: Primary | ICD-10-CM

## 2024-07-01 DIAGNOSIS — I10 HYPERTENSION, UNSPECIFIED TYPE: ICD-10-CM

## 2024-07-01 DIAGNOSIS — N40.1 BENIGN PROSTATIC HYPERPLASIA WITH INCOMPLETE BLADDER EMPTYING: ICD-10-CM

## 2024-07-01 DIAGNOSIS — N39.0 URINARY TRACT INFECTION WITHOUT HEMATURIA, SITE UNSPECIFIED: ICD-10-CM

## 2024-07-01 DIAGNOSIS — R39.14 BENIGN PROSTATIC HYPERPLASIA WITH INCOMPLETE BLADDER EMPTYING: ICD-10-CM

## 2024-07-01 LAB
ALBUMIN UR-MCNC: NEGATIVE MG/DL
APPEARANCE UR: CLEAR
BACTERIA #/AREA URNS HPF: ABNORMAL /HPF
BASOPHILS # BLD AUTO: 0 10E3/UL (ref 0–0.2)
BASOPHILS NFR BLD AUTO: 0 %
BILIRUB UR QL STRIP: NEGATIVE
COLOR UR AUTO: YELLOW
EOSINOPHIL # BLD AUTO: 0.1 10E3/UL (ref 0–0.7)
EOSINOPHIL NFR BLD AUTO: 1 %
ERYTHROCYTE [SEDIMENTATION RATE] IN BLOOD BY WESTERGREN METHOD: 36 MM/HR (ref 0–20)
GLUCOSE UR STRIP-MCNC: NEGATIVE MG/DL
HGB UR QL STRIP: NEGATIVE
HYALINE CASTS #/AREA URNS LPF: ABNORMAL /LPF
IMM GRANULOCYTES # BLD: 0 10E3/UL
IMM GRANULOCYTES NFR BLD: 0 %
KETONES UR STRIP-MCNC: NEGATIVE MG/DL
LEUKOCYTE ESTERASE UR QL STRIP: ABNORMAL
LYMPHOCYTES # BLD AUTO: 3.1 10E3/UL (ref 0.8–5.3)
LYMPHOCYTES NFR BLD AUTO: 22 %
MONOCYTES # BLD AUTO: 1.2 10E3/UL (ref 0–1.3)
MONOCYTES NFR BLD AUTO: 8 %
NEUTROPHILS # BLD AUTO: 9.4 10E3/UL (ref 1.6–8.3)
NEUTROPHILS NFR BLD AUTO: 68 %
NITRATE UR QL: NEGATIVE
PH UR STRIP: 6 [PH] (ref 5–7)
RBC #/AREA URNS AUTO: ABNORMAL /HPF
SP GR UR STRIP: 1.02 (ref 1–1.03)
SQUAMOUS #/AREA URNS AUTO: ABNORMAL /LPF
UROBILINOGEN UR STRIP-ACNC: 2 E.U./DL
WBC # BLD AUTO: 13.8 10E3/UL (ref 4–11)
WBC #/AREA URNS AUTO: ABNORMAL /HPF

## 2024-07-01 PROCEDURE — 85048 AUTOMATED LEUKOCYTE COUNT: CPT | Performed by: INTERNAL MEDICINE

## 2024-07-01 PROCEDURE — 85652 RBC SED RATE AUTOMATED: CPT | Performed by: INTERNAL MEDICINE

## 2024-07-01 PROCEDURE — 87086 URINE CULTURE/COLONY COUNT: CPT | Performed by: INTERNAL MEDICINE

## 2024-07-01 PROCEDURE — 85004 AUTOMATED DIFF WBC COUNT: CPT | Performed by: INTERNAL MEDICINE

## 2024-07-01 PROCEDURE — 36415 COLL VENOUS BLD VENIPUNCTURE: CPT | Performed by: INTERNAL MEDICINE

## 2024-07-01 PROCEDURE — 81001 URINALYSIS AUTO W/SCOPE: CPT | Performed by: INTERNAL MEDICINE

## 2024-07-01 PROCEDURE — 99495 TRANSJ CARE MGMT MOD F2F 14D: CPT | Performed by: INTERNAL MEDICINE

## 2024-07-01 RX ORDER — TAMSULOSIN HYDROCHLORIDE 0.4 MG/1
0.4 CAPSULE ORAL DAILY
Qty: 30 CAPSULE | Refills: 0 | Status: SHIPPED | OUTPATIENT
Start: 2024-07-01 | End: 2024-07-30

## 2024-07-01 NOTE — PROGRESS NOTES
ASSESSMENT:    1. Diverticulitis of colon  Currently afebrile.  On Augmentin.  No current left lower quadrant pain.  Bowel movements normal.  Labs as ordered to assess for resolution of the infection given previous localized  perforation.  Probable UTI component as below.  Will treat antibiotic therapy.  Patient instructed if having any persistent abdominal symptoms after a week or symptoms worsen before then or has new fevers, patient to inform physician and will then repeat CT scan of the abdomen to rule out walled off abscess  - WBC with Diff; Future  - ESR: Erythrocyte sedimentation rate; Future  - UA with Microscopic reflex to Culture - lab collect; Future  - WBC with Diff  - ESR: Erythrocyte sedimentation rate  - UA with Microscopic reflex to Culture - lab collect  - UA Microscopic with Reflex to Culture    2. Benign prostatic hyperplasia with incomplete bladder emptying  Patient is urinary hesitancy and slower stream.  History of BPH.  Start tamsulosin  - tamsulosin (FLOMAX) 0.4 MG capsule; Take 1 capsule (0.4 mg) by mouth daily  Dispense: 30 capsule; Refill: 0    3. Urinary tract infection without hematuria, site unspecified  Urinalysis shows some WBCs.  Labs above also show slight increase in white blood cell count and sed rate.  Will treat for apparent UTI with Cipro.  - ciprofloxacin (CIPRO) 500 MG tablet; Take 1 tablet (500 mg) by mouth 2 times daily for 5 days  Dispense: 10 tablet; Refill: 0     4. Hypertension, unspecified type  Controlled.  Continue generic Lotrel      PLAN:   Labs as ordered (blood, urine)   Tamsulosin 1 capsule daily in the PM for reduce urine flow to see if helps with pelvic pressure  Continue low fiber diet for now. Mushy when leaves your mouth  Cipro 500mg tab, 1 tab twice a day for 5 days for bladder infection   Finish Augmentin tomorrow  If having any abdominal pain in 1 week or worsen prior to that, then let me know and will get repeat CT abdomen  Continue other  medications         Michael Isabel is a 64 year old, presenting for the following health issues:  Hospital F/U      HPI         Hospital Follow-up Visit:    Hospital/Nursing Home/IP Rehab Facility: [facility]  Date of Admission: 6/21  Date of Discharge: 6/22  Reason(s) for Admission: Diverticulitis  Was the patient in the ICU or did the patient experience delirium during hospitalization?  No  Do you have any other stressors you would like to discuss with your provider? No    Problems taking medications regularly:  None  Medication changes since discharge: None  Problems adhering to non-medication therapy:  None    Summary of hospitalization:  [facility] discharge summary reviewed  Diagnostic Tests/Treatments reviewed.  Follow up needed: none  Other Healthcare Providers Involved in Patient s Care:         None    Plan of care communicated with patient       Discharge summary reviewed. Part of the summary as below:    [facility]  Hospitalist Discharge Summary       Date of Admission:  6/21/2024  Date of Discharge:  6/22/2024  Discharging Provider: Jas Acosta MD  Discharge Service: Hospitalist Service        Discharge Diagnoses  Acute perforated diverticulitis  Hypertension           Clinically Significant Risk Factors               Follow-ups Needed After Discharge  Follow-up Appointments     Follow-up and recommended labs and tests       Follow up with primary care provider, Hamilton Burnett, within 7-14 days for   hospital follow- up.  No follow up labs or test are needed.             Unresulted Labs Ordered in the Past 30 Days of this Admission         Date and Time Order Name Status Description     6/21/2024  7:29 PM Blood Culture Peripheral Blood Preliminary       6/21/2024  7:29 PM Blood Culture Peripheral Blood Preliminary            These results will be followed up by hospitalist discharge pool. Patient discharged with 10 days of  "augmentin           Discharge Disposition  Discharged to home  Condition at discharge: Stable           Hospital Course  Chalo Huynh is a 64 year old male with PMH significant for hypertension, deviated nasal septum who was sent to ED from urgent care for further evaluation and management of perforated diverticulitis, admitted inpatient 6/21/24.     He has been having abdominal pain along with some diarrhea for past 4 to 5 days, started after taking \"Russian Tea Cakes with nuts\".  He was seen by colorectal surgery who felt there was no need for operative intervention or follow up colonoscopy. His diet was advanced to low fiber which was tolerated well. He discharged home with 10 day course of augmentin.      Acute perforated diverticulitis  -has been having some chills and rigors at home; afebrile here  -leukocytosis with WBC 14.2; normal lactic acid  -UA unremarkable  -CT abdomen noted acute perforated sigmoid diverticulitis with some free intraperitoneal air, no associated abscess; also noted mild diffuse bladder wall thickening likely due to lack of distention and/or prostate enlargement or probable cystitis     -Low fiber diet  -Discharge on augmentin for 10 day course  -follow blood cultures     Hypertension  - continue PTA amlodipine-benazepril        Consultations This Hospital Stay  COLORECTAL SURGERY IP CONSULT       Code Status  Full Code        Time Spent on this Encounter  I, Jas Acosta MD, personally saw the patient today and spent greater than 30 minutes discharging this patient.        Most recent lab results reviewed with pt.      Update since discharge: improved. Still has rare  sweat at night but not every night. None past 2 days.    Denies current left lower quadrant abdominal pain.  Occasional soreness in early morning mid lower abdomen and improves with urination.  Urine stream slow.  Minimal dysuria and pressure feeling with urination.  No hematuria.  No fever currently.  " "Following low fiber diet.  Eating okay.  Bowel movements normal without blood.  Scheduled to be finished with Augmentin tomorrow.       Additional ROS:   Constitutional, HEENT, Cardiovascular, Pulmonary, GI and , Neuro, MSK and Psych review of systems/symptoms are otherwise negative or unchanged from previous, except as noted above.      OBJECTIVE:  /80   Pulse 72   Temp 98.7  F (37.1  C) (Oral)   Ht 1.854 m (6' 1\")   Wt 106 kg (233 lb 11.2 oz)   SpO2 93%   BMI 30.83 kg/m     Estimated body mass index is 30.83 kg/m  as calculated from the following:    Height as of this encounter: 1.854 m (6' 1\").    Weight as of this encounter: 106 kg (233 lb 11.2 oz).     Neck: no adenopathy. Thyroid normal to palpation. No bruits  Pulm: Lungs clear to auscultation   CV: Regular rates and rhythm  GI: Soft, nontender all 4 quadrants.  No rebound or guarding.  Obese. Normal active bowel sounds, No hepatosplenomegaly or masses palpable  Ext: Peripheral pulses intact. No edema.         (Chart documentation was completed, in part, with [employer] voice-recognition software. Even though reviewed, some grammatical, spelling, and word errors may remain.)    MED REC REQUIRED  Post Medication Reconciliation Status:  Discharge medications reconciled and changed, see notes/orders         Hamilton Burnett MD  Internal Medicine Department  [facility]            "

## 2024-07-01 NOTE — PATIENT INSTRUCTIONS
Labs as ordered (blood, urine)   Tamsulosin 1 capsule daily in the PM for reduce urine flow to see if helps with pelvic pressure  Continue low fiber diet for now. Mushy when leaves your mouth  Cipro 500mg tab, 1 tab twice a day for 5 days for bladder infection   Finish Augmentin tomorrow  If having any abdominal pain in 1 week or worsen prior to that, then let me know and will get repeat CT abdomen  Continue other medications

## 2024-07-02 PROBLEM — I10 HYPERTENSION, UNSPECIFIED TYPE: Status: ACTIVE | Noted: 2024-07-02

## 2024-07-02 RX ORDER — CIPROFLOXACIN 500 MG/1
500 TABLET, FILM COATED ORAL 2 TIMES DAILY
Qty: 10 TABLET | Refills: 0 | Status: SHIPPED | OUTPATIENT
Start: 2024-07-02 | End: 2024-07-07

## 2024-07-04 LAB — BACTERIA UR CULT: NO GROWTH

## 2024-07-29 DIAGNOSIS — N40.1 BENIGN PROSTATIC HYPERPLASIA WITH INCOMPLETE BLADDER EMPTYING: ICD-10-CM

## 2024-07-29 DIAGNOSIS — R39.14 BENIGN PROSTATIC HYPERPLASIA WITH INCOMPLETE BLADDER EMPTYING: ICD-10-CM

## 2024-07-30 RX ORDER — TAMSULOSIN HYDROCHLORIDE 0.4 MG/1
0.4 CAPSULE ORAL DAILY
Qty: 90 CAPSULE | Refills: 3 | Status: SHIPPED | OUTPATIENT
Start: 2024-07-30

## 2024-09-19 ENCOUNTER — ALLIED HEALTH/NURSE VISIT (OUTPATIENT)
Dept: INTERNAL MEDICINE | Facility: CLINIC | Age: 64
End: 2024-09-19
Payer: COMMERCIAL

## 2024-09-19 VITALS — DIASTOLIC BLOOD PRESSURE: 72 MMHG | SYSTOLIC BLOOD PRESSURE: 130 MMHG

## 2024-09-19 DIAGNOSIS — I10 HYPERTENSION, UNSPECIFIED TYPE: Primary | ICD-10-CM

## 2024-09-19 NOTE — PROGRESS NOTES
Chalo Huynh was evaluated at [facility] on September 19, 2024 at which time his blood pressure was:    BP Readings from Last 3 Encounters:   09/19/24 130/72   07/01/24 129/80   06/22/24 (!) 159/96     Pulse Readings from Last 3 Encounters:   07/01/24 72   06/22/24 79   06/21/24 68       Reviewed lifestyle modifications for blood pressure control and reduction: including making healthy food choices, managing weight, getting regular exercise, smoking cessation, reducing alcohol consumption, monitoring blood pressure regularly.     Symptoms: None    BP Goal:< 140/90 mmHg    BP Assessment:  BP at goal    Potential Reasons for BP too high: NA - Not applicable    BP Follow-Up Plan: Recheck BP in 6 months at pharmacy    Recommendation to Provider: BP checked at pharmacy and noted to be at goal of <140/90.   Recommended patient continue current therapy and follow-up in 6 months at the pharmacy.       Note completed by:     Thank you,    Terrence Kelly, PharmD  [facility]  P: [phone]

## 2024-09-20 NOTE — PROGRESS NOTES
Patient came to general surgery looking for previous nurse staff to thank them for his care. Charge RN reviewed chart to find he had been on [facility], patient escorted to 3rd floor.

## 2024-10-31 NOTE — ED TRIAGE NOTES
[x] [facility]  Outpatient Rehabilitation &  Therapy  [address]  [phone]  [phone]     Physical Therapy Daily Treatment Note    Date:  10/31/2024  Patient Name:  Ramiro Estes    :  1967  MRN: [id number]  Physician: Lukas Raman DO/ Edwin Fernandez DO                          Insurance: [facility] 48 units each of: 21783, 46651, 94019, 80321 and 12 units of 71196 from 10/7/24 to 24   Medical Diagnosis: Left shoulder pain M25.512                    Rehab Codes: M25.512, M25.611  Onset Date: 24                                 Next 's appt: MRI 10/8, Ortho    Visit# / total visits:      Cancels/No Shows: 0/0    Subjective:    Pain:  [x] Yes  [] No Location: L shoulder Pain Rating: (0-10 scale)  0/10   Pain altered Tx:  [x] No  [] Yes  Action:  Comments: States still no pain at rest and pain with all movements. Getting more painful with less movement.      Objective:  Modalities:   Vasopneumatic compression to left shoulder seated in chair with arm supported - cold (low pressure, 36 ° ) x 15 minutes after exercises     Precautions [] No  [x] Yes: MRI IMPRESSION:  1. 7 x 7 mm focus of full-thickness tear of the mid distal supraspinatus.  2. Mild tendinosis of the anterior distal infraspinatus.  3. Advanced acromioclavicular osteoarthrosis, exerting mass effect on the  myotendinous junction of supraspinatus.  4. Tiny tear of the inferior labrum. Small tear in the posterior labrum.  5. Mild thickening of the inferior joint capsule, which can be seen in the  setting of adhesive capsulitis  Exercises:  Exercise Reps/ Time Weight/ Level Comments   posture    Reviewed in sitting with lumbar roll and why this aligned position is beneficial with retracted scapula and chin retracted, Use of pillow for support of left arm in supine and sidelying  (Thera act)           Pulleys 2 mins ea  Flexion- max 130 deg, abd 130 deg   UBE- fwd/bkwd 6 min L4   Inc time and level  Patient had a CT scan of his abdomen, showing a perforated diverticulitis, was referred to the ED to be admitted.      Triage Assessment (Adult)       Row Name 06/21/24 1716          Triage Assessment    Airway WDL WDL        Respiratory WDL    Respiratory WDL WDL        Skin Circulation/Temperature WDL    Skin Circulation/Temperature WDL WDL        Cardiac WDL    Cardiac WDL WDL        Peripheral/Neurovascular WDL    Peripheral Neurovascular WDL WDL        Cognitive/Neuro/Behavioral WDL    Cognitive/Neuro/Behavioral WDL WDL

## 2024-11-25 ENCOUNTER — TELEPHONE (OUTPATIENT)
Dept: INTERNAL MEDICINE | Facility: CLINIC | Age: 64
End: 2024-11-25
Payer: COMMERCIAL

## 2024-11-25 DIAGNOSIS — I10 HYPERTENSION, UNSPECIFIED TYPE: ICD-10-CM

## 2024-11-25 RX ORDER — AMLODIPINE AND BENAZEPRIL HYDROCHLORIDE 5; 20 MG/1; MG/1
1 CAPSULE ORAL DAILY
Qty: 90 CAPSULE | Refills: 1 | Status: SHIPPED | OUTPATIENT
Start: 2024-11-25

## 2024-12-05 ENCOUNTER — ALLIED HEALTH/NURSE VISIT (OUTPATIENT)
Dept: INTERNAL MEDICINE | Facility: CLINIC | Age: 64
End: 2024-12-05
Payer: COMMERCIAL

## 2024-12-05 VITALS — SYSTOLIC BLOOD PRESSURE: 130 MMHG | DIASTOLIC BLOOD PRESSURE: 72 MMHG

## 2024-12-05 DIAGNOSIS — Z01.30 BP CHECK: Primary | ICD-10-CM

## 2024-12-05 NOTE — PROGRESS NOTES
Chalo Huynh was evaluated at [facility] on December 5, 2024 at which time his blood pressure was:    BP Readings from Last 1 Encounters:   12/05/24 130/72     No data recorded      Reviewed lifestyle modifications for blood pressure control and reduction: including making healthy food choices, managing weight, getting regular exercise, smoking cessation, reducing alcohol consumption, monitoring blood pressure regularly.     Symptoms: None    BP Goal:< 140/90 mmHg    BP Assessment:  BP at goal    Potential Reasons for BP too high: NA - Not applicable    BP Follow-Up Plan: Recheck BP in 6 months at pharmacy    Recommendation to Provider: bp check within goal, follow up for recheck in 6 months.    Note completed by: TREMAINE DUNLAP RPH

## 2024-12-17 ENCOUNTER — TELEPHONE (OUTPATIENT)
Dept: INTERNAL MEDICINE | Facility: CLINIC | Age: 64
End: 2024-12-17
Payer: COMMERCIAL

## 2024-12-17 NOTE — TELEPHONE ENCOUNTER
Pt last seen in July 2024. No mention of any MSK issues at that time. Prior to that, seen March 2023. Will need appt to assess/discuss MSK issues before referral considered to assess best treatment plan indicated. Would suggest pt see me this Friday at 9:30am in clinic  in open virtual spot if that is still open when this message is read by staff or see   REUBEN Nichols this Friday in open appt slot for further evaluation
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
## 2024-12-17 NOTE — TELEPHONE ENCOUNTER
Appt slot for 12/20/24 with me has been filled by other patient since did not hear back from pt. Pt to see Magdalena ALEXIS instead as per other option given in message below

## 2024-12-17 NOTE — TELEPHONE ENCOUNTER
Left message to call back. 12/20/24 slot still available. Please assist patient in scheduling when calling back

## 2025-03-20 ENCOUNTER — ALLIED HEALTH/NURSE VISIT (OUTPATIENT)
Dept: INTERNAL MEDICINE | Facility: CLINIC | Age: 65
End: 2025-03-20

## 2025-03-20 VITALS — SYSTOLIC BLOOD PRESSURE: 161 MMHG | DIASTOLIC BLOOD PRESSURE: 92 MMHG

## 2025-03-20 DIAGNOSIS — Z01.30 BP CHECK: Primary | ICD-10-CM

## 2025-03-20 PROCEDURE — 99207 PR NO CHARGE NURSE ONLY: CPT | Performed by: INTERNAL MEDICINE

## 2025-03-20 NOTE — PROGRESS NOTES
Chalo Huynh was evaluated at [facility] on March 20, 2025 at which time his blood pressure was:    BP Readings from Last 1 Encounters:   03/20/25 (!) 161/92     No data recorded      Reviewed lifestyle modifications for blood pressure control and reduction: including making healthy food choices, managing weight, getting regular exercise, smoking cessation, reducing alcohol consumption, monitoring blood pressure regularly.     Symptoms: None    BP Goal:< 140/90 mmHg    BP Assessment:  BP too high    Potential Reasons for BP too high: Physical activity within past 30 minutes    BP Follow-Up Plan: Recheck BP in 30 days in the pharmacy.Date of next scheduled BP visit or call:      Recommendation to Provider: Patient was not at goal.  He reported doing some physical activity about an hour before the blood pressure check in the pharmacy.  We spoke about weight management and diet and how it can impact blood pressure control.  Mr. Huynh stated that he has gained some weight and is planning on making dietary and physical activity modifications to help reduce blood pressure.  With his history of blood pressure results above goal he may be a good candidate for a dose adjustment to his Lotrel. I would consider a dose increase of either the amlodipine or benazepril ingredient.      Note completed by: Terrence Kelly [facility]

## 2025-03-22 ENCOUNTER — TELEPHONE (OUTPATIENT)
Dept: INTERNAL MEDICINE | Facility: CLINIC | Age: 65
End: 2025-03-22

## 2025-03-22 DIAGNOSIS — I10 HYPERTENSION, UNSPECIFIED TYPE: ICD-10-CM

## 2025-03-22 DIAGNOSIS — I10 HYPERTENSION, UNSPECIFIED TYPE: Primary | ICD-10-CM

## 2025-03-22 RX ORDER — AMLODIPINE AND BENAZEPRIL HYDROCHLORIDE 5; 40 MG/1; MG/1
1 CAPSULE ORAL DAILY
Qty: 30 CAPSULE | Refills: 11 | Status: SHIPPED | OUTPATIENT
Start: 2025-03-22

## 2025-03-22 NOTE — TELEPHONE ENCOUNTER
Call patient.  Blood pressure elevated with last clinic visit plus pharmacy blood pressure check.  Currently on amlodipine/benazepril 5/20 mg capsule once a day.  Patient to discontinue that medication and instead start higher dose amlodipine/benazepril 5/40 mg capsule, 1 capsule daily.  Prescription sent to patient's [facility] pharmacy.  Patient to get blood pressure rechecked through [facility] pharmacy in 3 to 4 weeks before running out of the 30-day supply by either scheduling an appointment through [email or web address] or calling [phone].  Would also encourage reduction in calorie/carbohydrate (sugar, bread, potato, pasta, rice, alcohol etc)  intake in diet and increase color on his plate with vegetables and increase  frequency of walking or other aerobic exercise as able

## 2025-03-22 NOTE — LETTER
March 26, 2025      Chalo Huynh  [address]  [place] [address]        Dear Sang,   The blood pressure reading with your last clinic pharmacy check remained elevated.  My staff has tried calling you on the phone 3 times with voicemail messages left but you have not returned a phone call to the clinic.  You are currently on amlodipine/benazepril 5/20 mg capsule once a day.  I would recommend you discontinue taking that dose and instead start higher dose amlodipine/benazepril 5/40 mg capsule, 1 capsule daily.  A prescription was recently sent to your [facility] pharmacy.  Please get a blood pressure rechecked through [facility] pharmacy in 3 to 4 weeks before running out of the 30-day supply by either scheduling an appointment through [email or web address] or calling [phone].  I would also encourage reduction in calorie/carbohydrate (sugar, bread, potato, pasta, rice, alcohol etc)  intake in your diet and increasing color on your plate with vegetables and increasing  frequency of walking or other aerobic exercise as able. If you have further questions regarding this matter, please feel free to contact the clinic at [phone].      Sincerely,        Hamilton Burnett MD    Electronically signed

## 2025-03-25 RX ORDER — AMLODIPINE AND BENAZEPRIL HYDROCHLORIDE 5; 40 MG/1; MG/1
1 CAPSULE ORAL DAILY
Qty: 90 CAPSULE | OUTPATIENT
Start: 2025-03-25

## 2025-03-25 NOTE — TELEPHONE ENCOUNTER
[employer] request for 90 day supply rejected. Rx was specifically written for smaller quantity and would have been written for larger amount at time of prescription if medically appropriate

## 2025-03-26 NOTE — TELEPHONE ENCOUNTER
Patient Contact    Attempt # 3    Was call answered?  No.  Left message on voicemail with information to call me back.    Upon call back, please relay provider note below to pt.     Routing to PCP as FYI as this is the last attempt to reach the pt via phone and he does not use [employer].   Suggest sending a letter with your message to pt?    Thank you,  Skye Matthew, RN

## 2025-03-26 NOTE — TELEPHONE ENCOUNTER
Letter written.  Please print the letter and mail to patient since he is not contacted the clinic back after 3 attempts

## 2025-04-02 NOTE — TELEPHONE ENCOUNTER
Patient walked into the clinic this morning and asked to speak to a nurse regarding the missed phone calls. Patient states he has yet to receive the letter sent in the mail.     RN reviewed message from the provider below. Patient states he already picked up the new medication and took his first dose this morning. RN informed patient to stop taking the previous dose. RN also advised for patient to schedule an appointment with pharmacy in 3-4 weeks for BP recheck. Patient did make note that he received a blood pressure monitor from his friend so he is going to start taking his blood pressure at home. RN advised to check a BP reading before medication and then 2-3 hours after taking the medication. Patient also asked great questions in regards to what he can do so he no longer has to take these medications. Patient states he is going to work on his food intake and increase his exercise.     Patient had no additional questions before leaving the clinic.     Will route to PCP as ANGELICA Ahuja RN

## 2025-04-20 NOTE — PATIENT INSTRUCTIONS
Labs as ordered   MRI brain and ultrasound liver at [facility]. They will call to schedule or you may call [phone]  Would recommend stopping alcohol use with memory issues. Remain off of marijuana   Referral to  Sleep clinic for probable obstructive sleep apnea contributing to memory issues. They will call to schedule. If you don't hear from a representative within 2 business days, please call [phone].  Continue current medications  Prescriptions refilled.     Discharge instructions reviewed with RN from [facility].  Ride arranged for patient.  Ambulated with standby assist to vestibule to wait for taxi.

## 2025-04-24 ENCOUNTER — ALLIED HEALTH/NURSE VISIT (OUTPATIENT)
Dept: INTERNAL MEDICINE | Facility: CLINIC | Age: 65
End: 2025-04-24

## 2025-04-24 VITALS — SYSTOLIC BLOOD PRESSURE: 161 MMHG | DIASTOLIC BLOOD PRESSURE: 81 MMHG

## 2025-04-24 DIAGNOSIS — Z01.30 BP CHECK: Primary | ICD-10-CM

## 2025-04-24 PROCEDURE — 99207 PR NO CHARGE NURSE ONLY: CPT | Performed by: INTERNAL MEDICINE

## 2025-04-24 NOTE — PROGRESS NOTES
Chalo Huynh was evaluated at [facility] on April 24, 2025 at which time his blood pressure was:    BP Readings from Last 1 Encounters:   04/24/25 (!) 161/81     He believes he missed his dose of amlodipine/benazepril this morning, but states that he usually is good about taking it.      Reviewed lifestyle modifications for blood pressure control and reduction: including making healthy food choices, managing weight, getting regular exercise, smoking cessation, reducing alcohol consumption, monitoring blood pressure regularly.     Symptoms: None    BP Goal:< 140/90 mmHg    BP Assessment:  BP too high    Potential Reasons for BP too high: Physical activity within past 30 minutes and Medication nonadherence    BP Follow-Up Plan: Recheck BP in 30 days in the pharmacy.Date of next scheduled BP visit or call: [phone]    Recommendation to Provider: dose of benazepril was increased last month, but BP is still high. Conside increasing the amlodipine component to 10mg. Patient plans to come back to pharmacy in a month for re-check.    Note completed by: Soham Vu [facility]    Thank you,  Soham Vu, PharmD  [facility]  Ph: [phone]

## 2025-05-14 ENCOUNTER — ALLIED HEALTH/NURSE VISIT (OUTPATIENT)
Dept: INTERNAL MEDICINE | Facility: CLINIC | Age: 65
End: 2025-05-14
Payer: COMMERCIAL

## 2025-05-14 VITALS — SYSTOLIC BLOOD PRESSURE: 146 MMHG | HEART RATE: 69 BPM | DIASTOLIC BLOOD PRESSURE: 92 MMHG

## 2025-05-14 DIAGNOSIS — Z01.30 BP CHECK: Primary | ICD-10-CM

## 2025-05-14 PROCEDURE — 99207 PR NO CHARGE NURSE ONLY: CPT | Performed by: INTERNAL MEDICINE

## 2025-05-14 NOTE — PROGRESS NOTES
Chalo Huynh was evaluated at [facility] on May 14, 2025 at which time his blood pressure was:    BP Readings from Last 1 Encounters:   05/14/25 (!) 146/92     No data recorded      Reviewed lifestyle modifications for blood pressure control and reduction: including making healthy food choices, managing weight, getting regular exercise, smoking cessation, reducing alcohol consumption, monitoring blood pressure regularly.     Symptoms: None    BP Goal:< 140/90 mmHg    BP Assessment:  BP too high    Potential Reasons for BP too high: Physical activity within past 30 minutes    BP Follow-Up Plan: Recheck BP in 30 days in the pharmacy.Date of next scheduled BP visit or call: [phone]    Recommendation to Provider: consider increasing amlodipine dose to 10mg, as patient has had multiple readings above goal. Patient has upcoming appointment on 5/19 scheduled as well.    Note completed by: Soham Vu [facility]    Thank you,  Soham Vu, PharmD  [facility]  Ph: [phone]

## 2025-05-19 ENCOUNTER — OFFICE VISIT (OUTPATIENT)
Dept: INTERNAL MEDICINE | Facility: CLINIC | Age: 65
End: 2025-05-19
Payer: COMMERCIAL

## 2025-05-19 VITALS
HEART RATE: 65 BPM | HEIGHT: 73 IN | RESPIRATION RATE: 18 BRPM | DIASTOLIC BLOOD PRESSURE: 80 MMHG | TEMPERATURE: 97.4 F | BODY MASS INDEX: 31.12 KG/M2 | WEIGHT: 234.8 LBS | SYSTOLIC BLOOD PRESSURE: 132 MMHG | OXYGEN SATURATION: 94 %

## 2025-05-19 DIAGNOSIS — I10 HYPERTENSION, UNSPECIFIED TYPE: ICD-10-CM

## 2025-05-19 DIAGNOSIS — T63.444A BEE STING REACTION, UNDETERMINED INTENT, INITIAL ENCOUNTER: ICD-10-CM

## 2025-05-19 DIAGNOSIS — R73.9 BLOOD GLUCOSE ELEVATED: ICD-10-CM

## 2025-05-19 DIAGNOSIS — R41.3 MEMORY LOSS: ICD-10-CM

## 2025-05-19 DIAGNOSIS — R06.83 SNORING: ICD-10-CM

## 2025-05-19 DIAGNOSIS — E78.5 HYPERLIPIDEMIA LDL GOAL <100: ICD-10-CM

## 2025-05-19 PROBLEM — K57.20 DIVERTICULITIS OF COLON WITH PERFORATION: Status: RESOLVED | Noted: 2024-06-21 | Resolved: 2025-05-19

## 2025-05-19 PROCEDURE — 3075F SYST BP GE 130 - 139MM HG: CPT | Performed by: INTERNAL MEDICINE

## 2025-05-19 PROCEDURE — 3079F DIAST BP 80-89 MM HG: CPT | Performed by: INTERNAL MEDICINE

## 2025-05-19 PROCEDURE — 99214 OFFICE O/P EST MOD 30 MIN: CPT | Performed by: INTERNAL MEDICINE

## 2025-05-19 RX ORDER — AMLODIPINE AND BENAZEPRIL HYDROCHLORIDE 5; 40 MG/1; MG/1
1 CAPSULE ORAL DAILY
Qty: 90 CAPSULE | Refills: 3 | Status: SHIPPED | OUTPATIENT
Start: 2025-05-19

## 2025-05-19 RX ORDER — EPINEPHRINE 0.3 MG/.3ML
0.3 INJECTION SUBCUTANEOUS PRN
Qty: 2 EACH | Refills: 3 | Status: SHIPPED | OUTPATIENT
Start: 2025-05-19

## 2025-05-19 NOTE — PROGRESS NOTES
ASSESSMENT:   1. Hypertension, unspecified type  Controlled.  Continue current medication.  Labs as ordered.  Sleep study for possible apnea component contributing to hypertension  - amLODIPine-benazepril (LOTREL) 5-40 MG capsule; Take 1 capsule by mouth daily.  Dispense: 90 capsule; Refill: 3  - Adult Sleep Eval & Management  Referral; Future  - Comprehensive metabolic panel; Future  - CBC with platelets; Future    2. Bee sting reaction, undetermined intent, initial encounter  Currently asymptomatic.  EpiPen refilled  - EPINEPHrine (ANY BX GENERIC EQUIV) 0.3 MG/0.3ML injection 2-pack; Inject 0.3 mLs (0.3 mg) into the muscle as needed for anaphylaxis.  Dispense: 2 each; Refill: 3    3. Hyperlipidemia LDL goal <100  History of hyperlipidemia with elevated CAD risk as below based on lipids from 2022.  Repeat labs and  will recalculate.  If CAD risk remains elevated, will recommend starting statin therapy  - Comprehensive metabolic panel; Future  - Lipid panel reflex to direct LDL Fasting; Future    4. Snoring  Increased risk factors for sleep apnea.  Refer for sleep study  - Adult Sleep Eval & Management  Referral; Future    5. Memory loss  Mild.  At risk for sleep apnea as potential cause.  Rule out metabolic causes and check MRI brain in addition.  Answering questions appropriately today.  Defer neuropsych testing at this time  - MR Brain w/o Contrast; Future  - Vitamin B12; Future  - TSH with free T4 reflex; Future  - Comprehensive metabolic panel; Future  - Adult Sleep Eval & Management  Referral; Future     6. Blood glucose elevated  Previous history of mild glucose elevation.  Recheck blood sugar and A1c.  Counseled regarding carbohydrate reduction and exercise for weight loss  - Hemoglobin A1c; Future  - Comprehensive metabolic panel; Future      PLAN:  Continue current medications  Prescriptions refilled at your pharmacy.    Labs today as ordered  Reduce calorie/carbohydrate  (sugar, bread, potato, pasta, rice, alcohol etc)  intake in diet.  Increase color on your plate with vegetables. Increase  frequency of walking or other aerobic exercise as able (goal is daily)  MRI brain for memory loss.   [facility] will call you to schedule. If you have not heard from their schedulers within 2 business days, then call [phone] (radiology scheduling)  Referral to  Sleep clinic for possible obstructive sleep apnea and  need for a sleep  study. They will call to schedule If you don't hear from a representative within 2 business days, please call [phone].            Michael Isabel is a 65 year old, presenting for the following health issues:  Hypertension  Pt would like to discuss cogitative screening with PCP       Most recent lab results reviewed with pt.      No alcohol this week.  Had 2 alcoholic drinks last week but overall not significantly.  Diet has worsened.  Has gained 11 pounds after previously losing 23 pounds.  Higher carbohydrate intake.  Not exercising.  Denies chest pain, shortness of breath, abdominal pain.  Feels like his memory is not as sharp as in the past.  Sometimes forgetting names.  No issues with general ADLs.  No driving concerns.  Denies headaches, vision changes, coordination issues  Has some daytime fatigue.  Snoring at night.  It is like he is sleeping okay overall.    History of bee sting anaphylaxis.  Needs updated EpiPen.  No symptoms currently     No identified additional risks  The 10-year ASCVD risk score (Elian CASTANON, et al., 2019) is: 20.6%    Values used to calculate the score:      Age: 65 years      Sex: Male      Is Non- : No      Diabetic: No      Tobacco smoker: No      Systolic Blood Pressure: 132 mmHg      Is BP treated: Yes      HDL Cholesterol: 39 mg/dL      Total Cholesterol: 228 mg/dL      Additional ROS:   Constitutional, HEENT, Cardiovascular, Pulmonary, GI and , Neuro, MSK and Psych review of  "systems/symptoms are otherwise negative or unchanged from previous, except as noted above.      OBJECTIVE:  /80   Pulse 65   Temp 97.4  F (36.3  C) (Temporal)   Resp 18   Ht 1.854 m (6' 1\")   Wt 106.5 kg (234 lb 12.8 oz)   SpO2 94%   BMI 30.98 kg/m     Estimated body mass index is 30.98 kg/m  as calculated from the following:    Height as of this encounter: 1.854 m (6' 1\").    Weight as of this encounter: 106.5 kg (234 lb 12.8 oz).  Eye: PERRL, EOMI  HENT: ear canals and TM's normal and nose and mouth without ulcers or lesions.  Narrowed posterior pharyngeal airway due to obesity  Neck: no adenopathy. Thyroid normal to palpation. No bruits  Pulm: Lungs clear to auscultation   CV: Regular rates and rhythm  GI: Soft, nontender, Normal active bowel sounds.  Mildly obese  Ext: Peripheral pulses intact. No edema.  Neuro: Normal strength and tone, sensory exam grossly normal distal extremities.  Answering questions appropriately with normal recall.  Normal gait      (Chart documentation was completed, in part, with [employer] voice-recognition software. Even though reviewed, some grammatical, spelling, and word errors may remain.)    Hamilton Burnett MD  Internal Medicine Department  [facility]            "

## 2025-05-19 NOTE — PATIENT INSTRUCTIONS
Continue current medications  Prescriptions refilled at your pharmacy.    Labs today as ordered  Reduce calorie/carbohydrate (sugar, bread, potato, pasta, rice, alcohol etc)  intake in diet.  Increase color on your plate with vegetables. Increase  frequency of walking or other aerobic exercise as able (goal is daily)  MRI brain for memory loss.   [facility] will call you to schedule. If you have not heard from their schedulers within 2 business days, then call [phone] (radiology scheduling)  Referral to  Sleep clinic for possible obstructive sleep apnea and  need for a sleep  study. They will call to schedule If you don't hear from a representative within 2 business days, please call [phone].

## 2025-07-29 ENCOUNTER — ALLIED HEALTH/NURSE VISIT (OUTPATIENT)
Dept: INTERNAL MEDICINE | Facility: CLINIC | Age: 65
End: 2025-07-29
Payer: COMMERCIAL

## 2025-07-29 VITALS — DIASTOLIC BLOOD PRESSURE: 88 MMHG | SYSTOLIC BLOOD PRESSURE: 144 MMHG | HEART RATE: 76 BPM

## 2025-07-29 DIAGNOSIS — Z01.30 BP CHECK: Primary | ICD-10-CM

## 2025-07-29 PROCEDURE — 99207 PR NO CHARGE NURSE ONLY: CPT | Performed by: INTERNAL MEDICINE

## 2025-07-29 NOTE — PROGRESS NOTES
Chalo Huynh was evaluated at [facility] on July 29, 2025 at which time his blood pressure was:    BP Readings from Last 1 Encounters:   07/29/25 (!) 144/88     No data recorded      Reviewed lifestyle modifications for blood pressure control and reduction: including making healthy food choices, managing weight, getting regular exercise, smoking cessation, reducing alcohol consumption, monitoring blood pressure regularly.     Symptoms: None    BP Goal:< 140/90 mmHg    BP Assessment:  BP too high    Potential Reasons for BP too high: Physical activity within past 30 minutes    BP Follow-Up Plan: Recheck BP in 30 days in the pharmacy.Date of next scheduled BP visit or call: [phone]    Recommendation to Provider: Continue current therapy with a blood pressure re-check in 30 days.  Patient had come in after physical activity and remained elevated.  Recommended BP check at home but coming back in 30 days for a re-check in the pharmacy without extra physical activity prior to the blood pressure check appointment    Note completed by: Terrence Kelly RPH

## 2025-07-30 NOTE — CONFIDENTIAL NOTE
BP was controlled well 2 mos ago  Pharmacy note reviewed. Agree with Pharm D recommendation to  recheck 1 month with same medication regimen

## 2025-08-11 DIAGNOSIS — I10 HYPERTENSION, UNSPECIFIED TYPE: ICD-10-CM

## 2025-08-11 RX ORDER — AMLODIPINE AND BENAZEPRIL HYDROCHLORIDE 10; 40 MG/1; MG/1
1 CAPSULE ORAL DAILY
Qty: 90 CAPSULE | Refills: 0 | Status: SHIPPED | OUTPATIENT
Start: 2025-08-11

## 2025-08-11 RX ORDER — AMLODIPINE AND BENAZEPRIL HYDROCHLORIDE 5; 40 MG/1; MG/1
1 CAPSULE ORAL DAILY
Qty: 90 CAPSULE | Refills: 3 | Status: CANCELLED | OUTPATIENT
Start: 2025-08-11

## 2025-08-12 ENCOUNTER — ALLIED HEALTH/NURSE VISIT (OUTPATIENT)
Dept: INTERNAL MEDICINE | Facility: CLINIC | Age: 65
End: 2025-08-12
Payer: COMMERCIAL

## 2025-08-12 VITALS — DIASTOLIC BLOOD PRESSURE: 95 MMHG | SYSTOLIC BLOOD PRESSURE: 154 MMHG | HEART RATE: 56 BPM

## 2025-08-12 DIAGNOSIS — Z01.30 BP CHECK: Primary | ICD-10-CM

## 2025-08-12 PROCEDURE — 99207 PR NO CHARGE NURSE ONLY: CPT | Performed by: INTERNAL MEDICINE

## 2025-08-15 ENCOUNTER — HOSPITAL ENCOUNTER (EMERGENCY)
Facility: CLINIC | Age: 65
Discharge: HOME | End: 2025-08-15
Attending: EMERGENCY MEDICINE | Admitting: EMERGENCY MEDICINE
Payer: COMMERCIAL

## 2025-08-15 ENCOUNTER — APPOINTMENT (OUTPATIENT)
Dept: CT IMAGING | Facility: CLINIC | Age: 65
End: 2025-08-15
Attending: STUDENT IN AN ORGANIZED HEALTH CARE EDUCATION/TRAINING PROGRAM
Payer: COMMERCIAL

## 2025-08-15 VITALS
HEART RATE: 66 BPM | OXYGEN SATURATION: 95 % | WEIGHT: 222 LBS | DIASTOLIC BLOOD PRESSURE: 81 MMHG | BODY MASS INDEX: 29.42 KG/M2 | HEIGHT: 73 IN | TEMPERATURE: 98 F | RESPIRATION RATE: 18 BRPM | SYSTOLIC BLOOD PRESSURE: 142 MMHG

## 2025-08-15 DIAGNOSIS — R19.7 DIARRHEA: Primary | ICD-10-CM

## 2025-08-15 DIAGNOSIS — R73.9 HYPERGLYCEMIA: ICD-10-CM

## 2025-08-15 DIAGNOSIS — R94.4 DECREASED GFR: ICD-10-CM

## 2025-08-15 DIAGNOSIS — M54.50 MIDLINE LOW BACK PAIN WITHOUT SCIATICA: ICD-10-CM

## 2025-08-15 LAB
ALBUMIN SERPL BCG-MCNC: 4.5 G/DL (ref 3.5–5.2)
ALP SERPL-CCNC: 66 U/L (ref 40–150)
ALT SERPL W P-5'-P-CCNC: 22 U/L (ref 0–70)
ANION GAP SERPL CALCULATED.3IONS-SCNC: 16 MMOL/L (ref 7–15)
AST SERPL W P-5'-P-CCNC: 25 U/L (ref 0–45)
BASOPHILS # BLD AUTO: 0.06 10E3/UL (ref 0–0.2)
BASOPHILS NFR BLD AUTO: 0.8 %
BILIRUB SERPL-MCNC: 0.9 MG/DL
BUN SERPL-MCNC: 30.7 MG/DL (ref 8–23)
CALCIUM SERPL-MCNC: 9.7 MG/DL (ref 8.8–10.4)
CHLORIDE SERPL-SCNC: 105 MMOL/L (ref 98–107)
CREAT SERPL-MCNC: 1.55 MG/DL (ref 0.67–1.17)
EGFRCR SERPLBLD CKD-EPI 2021: 49 ML/MIN/1.73M2
EOSINOPHIL # BLD AUTO: 0.16 10E3/UL (ref 0–0.7)
EOSINOPHIL NFR BLD AUTO: 2.1 %
ERYTHROCYTE [DISTWIDTH] IN BLOOD BY AUTOMATED COUNT: 13.5 % (ref 10–15)
GLUCOSE SERPL-MCNC: 201 MG/DL (ref 70–99)
HCO3 SERPL-SCNC: 17 MMOL/L (ref 22–29)
HCT VFR BLD AUTO: 45.3 % (ref 40–53)
HGB BLD-MCNC: 15.1 G/DL (ref 13.3–17.7)
IMM GRANULOCYTES # BLD: <0.03 10E3/UL
IMM GRANULOCYTES NFR BLD: 0.3 %
LACTATE SERPL-SCNC: 1.5 MMOL/L (ref 0.7–2)
LIPASE SERPL-CCNC: 34 U/L (ref 13–60)
LYMPHOCYTES # BLD AUTO: 2.72 10E3/UL (ref 0.8–5.3)
LYMPHOCYTES NFR BLD AUTO: 35.1 %
MCH RBC QN AUTO: 30.4 PG (ref 26.5–33)
MCHC RBC AUTO-ENTMCNC: 33.3 G/DL (ref 31.5–36.5)
MCV RBC AUTO: 91.1 FL (ref 78–100)
MONOCYTES # BLD AUTO: 0.77 10E3/UL (ref 0–1.3)
MONOCYTES NFR BLD AUTO: 9.9 %
NEUTROPHILS # BLD AUTO: 4.01 10E3/UL (ref 1.6–8.3)
NEUTROPHILS NFR BLD AUTO: 51.8 %
NRBC # BLD AUTO: <0.03 10E3/UL
NRBC BLD AUTO-RTO: 0 /100
PLATELET # BLD AUTO: 251 10E3/UL (ref 150–450)
POTASSIUM SERPL-SCNC: 4.5 MMOL/L (ref 3.4–5.3)
PROT SERPL-MCNC: 8 G/DL (ref 6.4–8.3)
RBC # BLD AUTO: 4.97 10E6/UL (ref 4.4–5.9)
SODIUM SERPL-SCNC: 138 MMOL/L (ref 135–145)
WBC # BLD AUTO: 7.74 10E3/UL (ref 4–11)

## 2025-08-15 PROCEDURE — 250N000009 HC RX 250: Performed by: STUDENT IN AN ORGANIZED HEALTH CARE EDUCATION/TRAINING PROGRAM

## 2025-08-15 PROCEDURE — 36415 COLL VENOUS BLD VENIPUNCTURE: CPT | Performed by: EMERGENCY MEDICINE

## 2025-08-15 PROCEDURE — 85004 AUTOMATED DIFF WBC COUNT: CPT | Performed by: EMERGENCY MEDICINE

## 2025-08-15 PROCEDURE — 96360 HYDRATION IV INFUSION INIT: CPT | Mod: 59

## 2025-08-15 PROCEDURE — 99285 EMERGENCY DEPT VISIT HI MDM: CPT | Mod: 25 | Performed by: EMERGENCY MEDICINE

## 2025-08-15 PROCEDURE — 250N000011 HC RX IP 250 OP 636: Performed by: STUDENT IN AN ORGANIZED HEALTH CARE EDUCATION/TRAINING PROGRAM

## 2025-08-15 PROCEDURE — 80053 COMPREHEN METABOLIC PANEL: CPT | Performed by: EMERGENCY MEDICINE

## 2025-08-15 PROCEDURE — 83690 ASSAY OF LIPASE: CPT | Performed by: EMERGENCY MEDICINE

## 2025-08-15 PROCEDURE — 74177 CT ABD & PELVIS W/CONTRAST: CPT

## 2025-08-15 PROCEDURE — 258N000003 HC RX IP 258 OP 636: Performed by: EMERGENCY MEDICINE

## 2025-08-15 PROCEDURE — 83605 ASSAY OF LACTIC ACID: CPT | Performed by: EMERGENCY MEDICINE

## 2025-08-15 RX ORDER — IOPAMIDOL 755 MG/ML
112 INJECTION, SOLUTION INTRAVASCULAR ONCE
Status: COMPLETED | OUTPATIENT
Start: 2025-08-15 | End: 2025-08-15

## 2025-08-15 RX ADMIN — SODIUM CHLORIDE 72 ML: 9 INJECTION, SOLUTION INTRAVENOUS at 09:30

## 2025-08-15 RX ADMIN — IOPAMIDOL 112 ML: 755 INJECTION, SOLUTION INTRAVENOUS at 09:30

## 2025-08-15 RX ADMIN — SODIUM CHLORIDE 1000 ML: 0.9 INJECTION, SOLUTION INTRAVENOUS at 08:17

## 2025-08-15 ASSESSMENT — ACTIVITIES OF DAILY LIVING (ADL)
ADLS_ACUITY_SCORE: 46

## 2025-08-15 ASSESSMENT — COLUMBIA-SUICIDE SEVERITY RATING SCALE - C-SSRS
1. IN THE PAST MONTH, HAVE YOU WISHED YOU WERE DEAD OR WISHED YOU COULD GO TO SLEEP AND NOT WAKE UP?: NO
2. HAVE YOU ACTUALLY HAD ANY THOUGHTS OF KILLING YOURSELF IN THE PAST MONTH?: NO
6. HAVE YOU EVER DONE ANYTHING, STARTED TO DO ANYTHING, OR PREPARED TO DO ANYTHING TO END YOUR LIFE?: NO

## (undated) RX ORDER — FENTANYL CITRATE 50 UG/ML
INJECTION, SOLUTION INTRAMUSCULAR; INTRAVENOUS
Status: DISPENSED
Start: 2022-08-05